# Patient Record
Sex: MALE | Race: WHITE | NOT HISPANIC OR LATINO | Employment: OTHER | URBAN - METROPOLITAN AREA
[De-identification: names, ages, dates, MRNs, and addresses within clinical notes are randomized per-mention and may not be internally consistent; named-entity substitution may affect disease eponyms.]

---

## 2017-04-03 ENCOUNTER — OFFICE VISIT (OUTPATIENT)
Dept: FAMILY MEDICINE | Facility: CLINIC | Age: 67
End: 2017-04-03

## 2017-04-03 VITALS
BODY MASS INDEX: 38.95 KG/M2 | HEART RATE: 71 BPM | HEIGHT: 69 IN | SYSTOLIC BLOOD PRESSURE: 126 MMHG | TEMPERATURE: 98.6 F | DIASTOLIC BLOOD PRESSURE: 70 MMHG | OXYGEN SATURATION: 97 % | WEIGHT: 263 LBS

## 2017-04-03 DIAGNOSIS — L30.9 DERMATITIS: ICD-10-CM

## 2017-04-03 DIAGNOSIS — Z23 NEED FOR VACCINATION: ICD-10-CM

## 2017-04-03 DIAGNOSIS — R21 RASH AND NONSPECIFIC SKIN ERUPTION: Primary | ICD-10-CM

## 2017-04-03 PROCEDURE — 90715 TDAP VACCINE 7 YRS/> IM: CPT | Performed by: FAMILY MEDICINE

## 2017-04-03 PROCEDURE — 90471 IMMUNIZATION ADMIN: CPT | Performed by: FAMILY MEDICINE

## 2017-04-03 PROCEDURE — 99213 OFFICE O/P EST LOW 20 MIN: CPT | Mod: 25 | Performed by: FAMILY MEDICINE

## 2017-04-03 RX ORDER — DESONIDE 0.5 MG/G
OINTMENT TOPICAL 3 TIMES DAILY PRN
Qty: 30 G | Refills: 1 | Status: SHIPPED | OUTPATIENT
Start: 2017-04-03 | End: 2020-06-08

## 2017-04-03 NOTE — NURSING NOTE
Maco Montes De Oca is here today for a Derm Issue: Spot on the upper lip right under his nose, x3-4 months, some discharge (bloody)     Pre-Visit Screening :  Immunizations : not up to date - Tdap Today  Colon Screening : is up to date  Asthma Action Test/Plan : NA  PHQ9/GAD7 :  NA  Pulse - regular  My Chart - declines    CLASSIFICATION OF OVERWEIGHT AND OBESITY BY BMI                         Obesity Class           BMI(kg/m2)  Underweight                                    < 18.5  Normal                                         18.5-24.9  Overweight                                     25.0-29.9  OBESITY                     I                  30.0-34.9                              II                 35.0-39.9  EXTREME OBESITY             III                >40                             Patient's  BMI Body mass index is 39.12 kg/(m^2).  http://hin.nhlbi.nih.gov/menuplanner/menu.cgi  Questioned patient about current smoking habits.  Pt. quit smoking some time ago.  Lupe Langley, CMA

## 2017-04-03 NOTE — PROGRESS NOTES
SUBJECTIVE:                                                    Maco Montes De Oca is a 66 year old male who presents to clinic today for the following health issues:        Rash     Onset: 2 mo    Description:   Location: nasolabial  Character: raised, red  Itching (Pruritis): YES- sometimes    Progression of Symptoms:  waxing and waning    Accompanying Signs & Symptoms:  Fever: no   Body aches or joint pain: no   Sore throat symptoms: no   Recent cold symptoms: no but has chronic nasal drainage that may irritate the area   History:   Previous similar rash: no     Precipitating factors:   Exposure to similar rash: no   New exposures: None   Recent travel: no     Alleviating factors:       Therapies Tried and outcome: none    The area bleeds when he shaves          Problem list and histories reviewed & adjusted, as indicated.  Additional history: as documented    Patient Active Problem List   Diagnosis     Essential hypertension, benign     Vesicular palmoplantar eczema     Primary localized osteoarthrosis, other specified sites     ACP (advance care planning)     Health Care Home     Family history of prostate cancer     Dacrocystitis, left     Idiopathic chronic gout without tophus, unspecified site     Past Surgical History:   Procedure Laterality Date     C ANESTH,HIP JOINT SURGERY      slipped epiphysis     HC PHAKIC IOL - IMPLANT FROM SURGEON  2013    right      HC PHAKIC IOL - IMPLANT FROM SURGEON  2013    left eye        Social History   Substance Use Topics     Smoking status: Former Smoker     Quit date: 1992     Smokeless tobacco: Former User     Types: Chew     Alcohol use No     Family History   Problem Relation Age of Onset     HEART DISEASE Mother       age 75     HEART DISEASE Father       mi age 65     Arthritis Brother      lupus         Allergies   Allergen Reactions     Allopurinol      vasculitis, Dr Marin confirmed     Penicillins      Recent Labs   Lab Test  16   0911   "05/05/15   0925  02/07/14   0955   LDL  82  74  79   HDL  37*  35*  31*   TRIG  146  137  139   ALT  17  20  18   CR  0.95  1.11  1.01   GFRESTIMATED  84  70  79   POTASSIUM  4.5  4.4  4.6      BP Readings from Last 3 Encounters:   04/03/17 126/70   11/10/16 128/68   05/24/16 134/72    Wt Readings from Last 3 Encounters:   04/03/17 119.3 kg (263 lb)   11/10/16 120.7 kg (266 lb)   05/24/16 119.6 kg (263 lb 9.6 oz)                    ROS:  Constitutional, HEENT, cardiovascular, pulmonary, gi and gu systems are negative, except as otherwise noted.    OBJECTIVE:                                                    /70 (BP Location: Left arm, Patient Position: Chair, Cuff Size: Adult Large)  Pulse 71  Temp 98.6  F (37  C) (Oral)  Ht 1.746 m (5' 8.75\")  Wt 119.3 kg (263 lb)  SpO2 97%  BMI 39.12 kg/m2  Body mass index is 39.12 kg/(m^2).   GENERAL: healthy, alert and no distress  NECK: no adenopathy, no asymmetry, masses, or scars and thyroid normal to palpation  RESP: lungs clear to auscultation - no rales, rhonchi or wheezes  CV: regular rate and rhythm, normal S1 S2, no S3 or S4, no murmur, click or rub, no peripheral edema and peripheral pulses strong  ABDOMEN: soft, nontender, no hepatosplenomegaly, no masses and bowel sounds normal  SKIN: pt has erythematous maculopapular patch with several small pustules in midline nasolabial region    Diagnostic Test Results:  none      ASSESSMENT:                                                        PLAN:                                                    (R21) Rash and nonspecific skin eruption  (primary encounter diagnosis)  Comment: likely small patch folliculitis in shaved area vs staph/impetigo  Plan: mupirocin (BACTROBAN NASAL) 2 % nasal ointment        Will try topical abx, if not better consider derm referral    (L30.9) Dermatitis  Comment: ear dermatitis stable  Plan: desonide (DESOWEN) 0.05 % ointment        I reviewed the risks, benefits, and possible side " "effects of the medication.  The patient had an opportunity to ask any questions regarding the treatment plan. The patient was encouraged to call my office if any problems.     (Z23) Need for vaccination  Comment:   Plan: TDAP VACCINE (BOOSTRIX), VACCINE         ADMINISTRATION, INITIAL              BMI:   Estimated body mass index is 39.12 kg/(m^2) as calculated from the following:    Height as of this encounter: 1.746 m (5' 8.75\").    Weight as of this encounter: 119.3 kg (263 lb).   Weight management plan: Discussed healthy diet and exercise guidelines and patient will follow up in 6 months in clinic to re-evaluate.      Work on weight loss  Regular exercise    Maco Galindo MD  [facility]      "

## 2017-04-03 NOTE — MR AVS SNAPSHOT
"              After Visit Summary   4/3/2017    Maco Montes De cOa    MRN: [id number]           Patient Information     Date Of Birth          1950        Visit Information        Provider Department      4/3/2017 3:15 PM Maco Galindo MD [facility]        Today's Diagnoses     Rash and nonspecific skin eruption    -  1    Dermatitis        Need for vaccination           Follow-ups after your visit        Your next 10 appointments already scheduled     May 25, 2017  8:00 AM CDT   Physical-Complete with Maco Galindo MD   [facility] ([facility])    [address]  [address]  [facility] [id number] [phone]           Instruct patient that they should have nothing(except water) after midnight the evening prior to the appointment.              Who to contact     If you have questions or need follow up information about today's clinic visit or your schedule please contact [facility] directly at [phone].  Normal or non-critical lab and imaging results will be communicated to you by MyChart, letter or phone within 4 business days after the clinic has received the results. If you do not hear from us within 7 days, please contact the clinic through [employer] or phone. If you have a critical or abnormal lab result, we will notify you by phone as soon as possible.  Submit refill requests through [employer] or call your pharmacy and they will forward the refill request to us. Please allow 3 business days for your refill to be completed.          Additional Information About Your Visit        Care EveryWhere ID     This is your Care EveryWhere ID. This could be used by other organizations to access your [facility] medical records  [id number]        Your Vitals Were     Pulse Temperature Height Pulse Oximetry BMI (Body Mass Index)       71 98.6  F (37  C) (Oral) 1.746 m (5' 8.75\") 97% 39.12 kg/m2  "       Blood Pressure from Last 3 Encounters:   04/03/17 126/70   11/10/16 128/68   05/24/16 134/72    Weight from Last 3 Encounters:   04/03/17 119.3 kg (263 lb)   11/10/16 120.7 kg (266 lb)   05/24/16 119.6 kg (263 lb 9.6 oz)              We Performed the Following     TDAP VACCINE (BOOSTRIX)     VACCINE ADMINISTRATION, INITIAL          Today's Medication Changes          These changes are accurate as of: 4/3/17  4:13 PM.  If you have any questions, ask your nurse or doctor.               Start taking these medicines.        Dose/Directions    mupirocin 2 % nasal ointment   Commonly known as:  BACTROBAN NASAL   Used for:  Rash and nonspecific skin eruption   Started by:  Maco Galindo MD        Dose:  1 g   Apply 1 g into each nare 3 times daily for 5 days   Quantity:  22 g   Refills:  0            Where to get your medicines      These medications were sent to [facility]/pharmacy #[id number] - [facility] [address]  [address] [email or web address] [address]     Phone:  [phone]     mupirocin 2 % nasal ointment         Some of these will need a paper prescription and others can be bought over the counter.  Ask your nurse if you have questions.     Bring a paper prescription for each of these medications     desonide 0.05 % ointment                Primary Care Provider Office Phone # Fax #    Maco Galindo [phone]       [place] [address] [facility] [address]  [facility] [address]        Thank you!     Thank you for choosing [facility]  for your care. Our goal is always to provide you with excellent care. Hearing back from our patients is one way we can continue to improve our services. Please take a few minutes to complete the written survey that you may receive in the mail after your visit with us. Thank you!             Your Updated Medication List - Protect others around you: Learn how to safely use, store and throw away  your medicines at www.disposemymeds.org.          This list is accurate as of: 4/3/17  4:13 PM.  Always use your most recent med list.                   Brand Name Dispense Instructions for use    desonide 0.05 % ointment    DESOWEN    30 g    Apply topically 3 times daily as needed Apply sparingly to affected area.  Will call when needed       lisinopril 40 MG tablet    PRINIVIL/ZESTRIL    90 tablet    Take 1 tablet (40 mg) by mouth daily       mupirocin 2 % nasal ointment    BACTROBAN NASAL    22 g    Apply 1 g into each nare 3 times daily for 5 days       probenecid 500 MG tablet    BENEMID    180 tablet    TAKE 1 TABLET BY MOUTH TWICE A DAY

## 2017-06-19 ENCOUNTER — OFFICE VISIT (OUTPATIENT)
Dept: FAMILY MEDICINE | Facility: CLINIC | Age: 67
End: 2017-06-19

## 2017-06-19 VITALS
BODY MASS INDEX: 39.77 KG/M2 | WEIGHT: 262.4 LBS | SYSTOLIC BLOOD PRESSURE: 128 MMHG | TEMPERATURE: 98.4 F | OXYGEN SATURATION: 97 % | HEIGHT: 68 IN | HEART RATE: 83 BPM | DIASTOLIC BLOOD PRESSURE: 70 MMHG

## 2017-06-19 DIAGNOSIS — Z00.00 ROUTINE GENERAL MEDICAL EXAMINATION AT A HEALTH CARE FACILITY: Primary | ICD-10-CM

## 2017-06-19 DIAGNOSIS — M1A.00X0 IDIOPATHIC CHRONIC GOUT WITHOUT TOPHUS, UNSPECIFIED SITE: ICD-10-CM

## 2017-06-19 DIAGNOSIS — I10 ESSENTIAL HYPERTENSION, BENIGN: ICD-10-CM

## 2017-06-19 DIAGNOSIS — Z80.42 FAMILY HISTORY OF PROSTATE CANCER: ICD-10-CM

## 2017-06-19 PROCEDURE — 80053 COMPREHEN METABOLIC PANEL: CPT | Mod: 90 | Performed by: FAMILY MEDICINE

## 2017-06-19 PROCEDURE — 99397 PER PM REEVAL EST PAT 65+ YR: CPT | Performed by: FAMILY MEDICINE

## 2017-06-19 PROCEDURE — G0103 PSA SCREENING: HCPCS | Performed by: FAMILY MEDICINE

## 2017-06-19 PROCEDURE — 36415 COLL VENOUS BLD VENIPUNCTURE: CPT | Performed by: FAMILY MEDICINE

## 2017-06-19 PROCEDURE — 86803 HEPATITIS C AB TEST: CPT | Mod: 90 | Performed by: FAMILY MEDICINE

## 2017-06-19 PROCEDURE — 84550 ASSAY OF BLOOD/URIC ACID: CPT | Mod: 90 | Performed by: FAMILY MEDICINE

## 2017-06-19 RX ORDER — CLOSTRIDIUM TETANI TOXOID ANTIGEN (FORMALDEHYDE INACTIVATED), CORYNEBACTERIUM DIPHTHERIAE TOXOID ANTIGEN (FORMALDEHYDE INACTIVATED), BORDETELLA PERTUSSIS TOXOID ANTIGEN (GLUTARALDEHYDE INACTIVATED), BORDETELLA PERTUSSIS FILAMENTOUS HEMAGGLUTININ ANTIGEN (FORMALDEHYDE INACTIVATED), BORDETELLA PERTUSSIS PERTACTIN ANTIGEN, AND BORDETELLA PERTUSSIS FIMBRIAE 2/3 ANTIGEN 5; 2; 2.5; 5; 3; 5 [LF]/.5ML; [LF]/.5ML; UG/.5ML; UG/.5ML; UG/.5ML; UG/.5ML
INJECTION, SUSPENSION INTRAMUSCULAR
COMMUNITY
Start: 2017-04-03 | End: 2018-05-15

## 2017-06-19 RX ORDER — LISINOPRIL 40 MG/1
40 TABLET ORAL DAILY
Qty: 90 TABLET | Refills: 1 | Status: SHIPPED | OUTPATIENT
Start: 2017-06-19 | End: 2017-11-20

## 2017-06-19 RX ORDER — MUPIROCIN 20 MG/G
OINTMENT TOPICAL
Refills: 0 | COMMUNITY
Start: 2017-04-03 | End: 2018-05-15

## 2017-06-19 RX ORDER — PROBENECID 500 MG/1
TABLET, FILM COATED ORAL
Qty: 180 TABLET | Refills: 1 | Status: SHIPPED | OUTPATIENT
Start: 2017-06-19 | End: 2017-11-20

## 2017-06-19 NOTE — NURSING NOTE
"Maco oMntes De Oca is here for a CPX. Fasting: Yes 12+ hours.    Pre-visit Planning  Immunizations -up to date  Colonoscopy -is up to date (Performed on 01/11/2016)  Mammogram -NA  Asthma test --NA  PHQ2 -is completed today  YELITZA 7 -NA  Fall Risk Assessment -is completed today    Vitals:  BP Cuff left  Arm with large Cuff  PULSE regular  262 lbs 6.4 oz and 5' 7.5\"  CLASSIFICATION OF OVERWEIGHT AND OBESITY BY BMI                        Obesity Class           BMI(kg/m2)  Underweight                                    < 18.5  Normal                                         18.5-24.9  Overweight                                     25.0-29.9  OBESITY                     I                  30.0-34.9                             II                 35.0-39.9  EXTREME OBESITY             III                >40      Patient's  BMI Body mass index is 40.49 kg/(m^2).  Http://hin.nhlbi.nih.gov/menuplanner/menu.cgi  Tobacco Use:  Questioned patient about current smoking habits.  Pt. quit smoking some time ago.  ETOH screening Questions:  1-How often do you have a drink containing alcohol?                             Never  2-How many drinks containing alcohol do you have on a typical day when you are         Drinking?                              N/A   3- How often do you have 5 or more drinks on one occasion?                              N/A     Have you ever:  None of the patient's responses to the CAGE screening were positive / Negative CAGE score    Roomed by: Lupe Langley CMA      "

## 2017-06-19 NOTE — LETTER
[facility]  [facility] [address]  [place]  [address]    June 20, 2017            Maco Montes De Oca  [address] [facility] [address]                  Dear Maco Montes De Oca      LAB RESULTS:     The results of your recent hepatitis C test, uric acid, Blood Sugar, Kidney Tests and Liver Panel were NORMAL.    Your PSA test has risen so I would like to have you recheck this in 3-6 months. If it is higher at that time we will refer you to urology.       If you have any further questions or problems, please contact our office at [phone].          Maco Galindo MD

## 2017-06-19 NOTE — MR AVS SNAPSHOT
"              After Visit Summary   6/19/2017    Maco Montes De Oca    MRN: [id number]           Patient Information     Date Of Birth          1950        Visit Information        Provider Department      6/19/2017 10:30 AM Maco Galindo MD [facility]        Today's Diagnoses     Routine general medical examination at a health care facility    -  1    Essential hypertension, benign        Idiopathic chronic gout without tophus, unspecified site        Family history of prostate cancer           Follow-ups after your visit        Follow-up notes from your care team     Return in about 1 year (around 6/19/2018).      Who to contact     If you have questions or need follow up information about today's clinic visit or your schedule please contact [facility] directly at [phone].  Normal or non-critical lab and imaging results will be communicated to you by MyChart, letter or phone within 4 business days after the clinic has received the results. If you do not hear from us within 7 days, please contact the clinic through MyChart or phone. If you have a critical or abnormal lab result, we will notify you by phone as soon as possible.  Submit refill requests through [employer] or call your pharmacy and they will forward the refill request to us. Please allow 3 business days for your refill to be completed.          Additional Information About Your Visit        Care EveryWhere ID     This is your Care EveryWhere ID. This could be used by other organizations to access your [place] medical records  [id number]        Your Vitals Were     Pulse Temperature Height Pulse Oximetry BMI (Body Mass Index)       83 98.4  F (36.9  C) (Oral) 1.715 m (5' 7.5\") 97% 40.49 kg/m2        Blood Pressure from Last 3 Encounters:   06/19/17 128/70   04/03/17 126/70   11/10/16 128/68    Weight from Last 3 Encounters:   06/19/17 119 kg (262 lb 6.4 oz)   04/03/17 119.3 kg (263 lb) "   11/10/16 120.7 kg (266 lb)              We Performed the Following     COMPREHENSIVE METABOLIC PANEL ([facility]) XCMP     HCL PSA, SCREENING ([facility])     Hepatits C antibody ([facility])     URIC ACID ([facility])     VENOUS COLLECTION          Where to get your medicines      These medications were sent to [facility]/pharmacy #[id number] - [place], [place] - [address]  [address] [address] [employer], [facility] [address]     Phone:  [phone]     lisinopril 40 MG tablet    probenecid 500 MG tablet          Primary Care Provider Office Phone # Fax #    Maco Galindo [phone]       [facility] [address] [facility] [address]  [facility] [address]        Thank you!     Thank you for choosing [facility]  for your care. Our goal is always to provide you with excellent care. Hearing back from our patients is one way we can continue to improve our services. Please take a few minutes to complete the written survey that you may receive in the mail after your visit with us. Thank you!             Your Updated Medication List - Protect others around you: Learn how to safely use, store and throw away your medicines at www.disposemymeds.org.          This list is accurate as of: 6/19/17 11:01 AM.  Always use your most recent med list.                   Brand Name Dispense Instructions for use    ADACEL 5-2-15.5 LF-MCG/0.5 injection   Generic drug:  Tdap (tetanus-diphtheria-acell pertussis)          desonide 0.05 % ointment    DESOWEN    30 g    Apply topically 3 times daily as needed Apply sparingly to affected area.  Will call when needed       lisinopril 40 MG tablet    PRINIVIL/ZESTRIL    90 tablet    Take 1 tablet (40 mg) by mouth daily       mupirocin 2 % ointment    BACTROBAN     APPLY 1 GRAM INTO EACH NOSTRIL 3 TIMES DAILY FOR 5 DAYS       probenecid 500 MG tablet    BENEMID    180 tablet    TAKE 1 TABLET BY MOUTH TWICE A DAY

## 2017-06-19 NOTE — PROGRESS NOTES
SUBJECTIVE:     CC: Maco Montes De Oca is an 66 year old male who presents for preventative health visit.     Healthy Habits:    Do you get at least three servings of calcium containing foods daily (dairy, green leafy vegetables, etc.)? yes    Amount of exercise or daily activities, outside of work: 5 day(s) per week    Problems taking medications regularly No    Medication side effects: No    Have you had an eye exam in the past two years? no    Do you see a dentist twice per year? yes    Do you have sleep apnea, excessive snoring or daytime drowsiness?no            Today's PHQ-2 Score:   PHQ-2 ( 1999 Pfizer) 6/19/2017 5/24/2016   Q1: Little interest or pleasure in doing things 0 0   Q2: Feeling down, depressed or hopeless 0 0   PHQ-2 Score 0 0       Abuse: Current or Past(Physical, Sexual or Emotional)- No  Do you feel safe in your environment - Yes    Social History   Substance Use Topics     Smoking status: Former Smoker     Quit date: 1/1/1992     Smokeless tobacco: Former User     Types: Chew     Alcohol use No     The patient does not drink >3 drinks per day nor >7 drinks per week.    Last PSA:   Abbott PSA   Date Value Ref Range Status   05/05/2015 2.2 < OR = 4.0 ng/mL Final     Comment:        This test was performed using the Siemens  chemiluminescent method. Values obtained from  different assay methods cannot be used  interchangeably. PSA levels, regardless of  value, should not be interpreted as absolute  evidence of the presence or absence of disease.            Recent Labs   Lab Test  05/24/16   0928  05/05/15   0925   CHOL  148  136   HDL  37*  35*   LDL  82  74   TRIG  146  137   CHOLHDLRATIO  4.0  3.9       Reviewed orders with patient. Reviewed health maintenance and updated orders accordingly - Yes    Reviewed and updated as needed this visit by clinical staff  Tobacco  Allergies  Meds  Problems         Reviewed and updated as needed this visit by Provider        No past medical history on  file.   Past Surgical History:   Procedure Laterality Date     C ANESTH,HIP JOINT SURGERY      slipped epiphysis     HC PHAKIC IOL - IMPLANT FROM SURGEON  2013    right      HC PHAKIC IOL - IMPLANT FROM SURGEON  2013    left eye        ROS:  C: NEGATIVE for fever, chills, change in weight  I: NEGATIVE for worrisome rashes, moles or lesions  E: NEGATIVE for vision changes or irritation  ENT: NEGATIVE for ear, mouth and throat problems  R: NEGATIVE for significant cough or SOB  CV: NEGATIVE for chest pain, palpitations or peripheral edema  GI: NEGATIVE for nausea, abdominal pain, heartburn, or change in bowel habits   male: negative for dysuria, hematuria, decreased urinary stream, erectile dysfunction, urethral discharge  M: NEGATIVE for significant arthralgias or myalgia  N: NEGATIVE for weakness, dizziness or paresthesias  E: NEGATIVE for temperature intolerance, skin/hair changes  H: NEGATIVE for bleeding problems  P: NEGATIVE for changes in mood or affect    Problem list, Medication list, Allergies, and Medical/Social/Surgical histories reviewed in [facility] and updated as appropriate.  Labs reviewed in EPIC  BP Readings from Last 3 Encounters:   06/19/17 128/70   04/03/17 126/70   11/10/16 128/68    Wt Readings from Last 3 Encounters:   06/19/17 119 kg (262 lb 6.4 oz)   04/03/17 119.3 kg (263 lb)   11/10/16 120.7 kg (266 lb)                  Patient Active Problem List   Diagnosis     Essential hypertension, benign     Vesicular palmoplantar eczema     Primary localized osteoarthrosis, other specified sites     ACP (advance care planning)     Health Care Home     Family history of prostate cancer     Dacrocystitis, left     Idiopathic chronic gout without tophus, unspecified site     Past Surgical History:   Procedure Laterality Date     C ANESTH,HIP JOINT SURGERY      slipped epiphysis     HC PHAKIC IOL - IMPLANT FROM SURGEON  2013    right      HC PHAKIC IOL - IMPLANT FROM SURGEON  2013    left eye        Social  "History   Substance Use Topics     Smoking status: Former Smoker     Quit date: 1992     Smokeless tobacco: Former User     Types: Chew     Alcohol use No     Family History   Problem Relation Age of Onset     HEART DISEASE Mother       age 75     HEART DISEASE Father       mi age 65     Arthritis Brother      lupus         Current Outpatient Prescriptions   Medication Sig Dispense Refill     lisinopril (PRINIVIL/ZESTRIL) 40 MG tablet Take 1 tablet (40 mg) by mouth daily 90 tablet 1     probenecid (BENEMID) 500 MG tablet TAKE 1 TABLET BY MOUTH TWICE A  tablet 1     desonide (DESOWEN) 0.05 % ointment Apply topically 3 times daily as needed Apply sparingly to affected area.    Will call when needed 30 g 1     ADACEL 5-2-15.5 LF-MCG/0.5 injection        mupirocin (BACTROBAN) 2 % ointment APPLY 1 GRAM INTO EACH NOSTRIL 3 TIMES DAILY FOR 5 DAYS  0     [DISCONTINUED] lisinopril (PRINIVIL,ZESTRIL) 40 MG tablet Take 1 tablet (40 mg) by mouth daily 90 tablet 1     Allergies   Allergen Reactions     Allopurinol      vasculitis, Dr Marin confirmed     Penicillins      Recent Labs   Lab Test  16   0928  05/05/15   0925  14   0955   LDL  82  74  79   HDL  37*  35*  31*   TRIG  146  137  139   ALT  17  20  18   CR  0.95  1.11  1.01   GFRESTIMATED  84  70  79   POTASSIUM  4.5  4.4  4.6      OBJECTIVE:     /70 (BP Location: Left arm, Patient Position: Chair, Cuff Size: Adult Large)  Pulse 83  Temp 98.4  F (36.9  C) (Oral)  Ht 1.715 m (5' 7.5\")  Wt 119 kg (262 lb 6.4 oz)  SpO2 97%  BMI 40.49 kg/m2  EXAM:  GENERAL: healthy, alert and no distress  EYES: Eyes grossly normal to inspection, PERRL and conjunctivae and sclerae normal  HENT: ear canals and TM's normal, nose and mouth without ulcers or lesions  NECK: no adenopathy, no asymmetry, masses, or scars and thyroid normal to palpation  RESP: lungs clear to auscultation - no rales, rhonchi or wheezes  CV: regular rate and rhythm, normal S1 " "S2, no S3 or S4, no murmur, click or rub, no peripheral edema and peripheral pulses strong  ABDOMEN: soft, nontender, no hepatosplenomegaly, no masses and bowel sounds normal   (male): normal male genitalia without lesions or urethral discharge, no hernia  RECTAL (male): deferred  MS: no gross musculoskeletal defects noted, no edema  SKIN: no suspicious lesions or rashes  NEURO: Normal strength and tone, mentation intact and speech normal  PSYCH: mentation appears normal, affect normal/bright  LYMPH: no cervical, supraclavicular, axillary, or inguinal adenopathy    ASSESSMENT/PLAN:     (Z00.00) Routine general medical examination at a health care facility  (primary encounter diagnosis)  Comment: discussed preventitive healthcare   Plan: Hepatits C antibody ([facility]), VENOUS COLLECTION,        HCL PSA, SCREENING ([facility]), COMPREHENSIVE         METABOLIC PANEL ([facility]) XCMP        Continue to work on healthy diet and exercise, discussed healthy habits     (I10) Essential hypertension, benign  Comment: well control  Plan: lisinopril (PRINIVIL/ZESTRIL) 40 MG tablet,         COMPREHENSIVE METABOLIC PANEL ([facility]) XCMP        continue current medications at current doses     (M1A.00X0) Idiopathic chronic gout without tophus, unspecified site  Comment: well controlled  Plan: probenecid (BENEMID) 500 MG tablet        continue current medications at current doses     (Z80.42) Family history of prostate cancer  Comment:   Plan: HCL PSA, SCREENING ([facility])              COUNSELING:  Reviewed preventive health counseling, as reflected in patient instructions       Regular exercise       Healthy diet/nutrition       Colon cancer screening       Prostate cancer screening         reports that he quit smoking about 25 years ago. He has quit using smokeless tobacco. His smokeless tobacco use included Chew.    Estimated body mass index is 40.49 kg/(m^2) as calculated from the following:    Height as of this encounter: 1.715 m (5' 7.5\").   "  Weight as of this encounter: 119 kg (262 lb 6.4 oz).   Weight management plan: Discussed healthy diet and exercise guidelines and patient will follow up in 12 months in clinic to re-evaluate.    Counseling Resources:  ATP IV Guidelines  Pooled Cohorts Equation Calculator  FRAX Risk Assessment  ICSI Preventive Guidelines  Dietary Guidelines for Americans, 2010  USDA's MyPlate  ASA Prophylaxis  Lung CA Screening    Maco Galindo MD  [facility]

## 2017-06-20 LAB
ABBOTT PSA - QUEST: 4.6 NG/ML
ALBUMIN SERPL-MCNC: 4.4 G/DL (ref 3.6–5.1)
ALBUMIN/GLOB SERPL: 1.5 (CALC) (ref 1–2.5)
ALP SERPL-CCNC: 74 U/L (ref 40–115)
ALT SERPL-CCNC: 17 U/L (ref 9–46)
AST SERPL-CCNC: 16 U/L (ref 10–35)
BILIRUB SERPL-MCNC: 1.2 MG/DL (ref 0.2–1.2)
BUN SERPL-MCNC: 14 MG/DL (ref 7–25)
BUN/CREATININE RATIO: NORMAL (CALC) (ref 6–22)
CALCIUM SERPL-MCNC: 10.2 MG/DL (ref 8.6–10.3)
CHLORIDE SERPLBLD-SCNC: 104 MMOL/L (ref 98–110)
CO2 SERPL-SCNC: 24 MMOL/L (ref 20–31)
CREAT SERPL-MCNC: 0.96 MG/DL (ref 0.7–1.25)
EGFR AFRICAN AMERICAN - QUEST: 95 ML/MIN/1.73M2
GFR SERPL CREATININE-BSD FRML MDRD: 82 ML/MIN/1.73M2
GLOBULIN, CALCULATED - QUEST: 3 G/DL (CALC) (ref 1.9–3.7)
GLUCOSE - QUEST: 98 MG/DL (ref 65–99)
HCV AB - QUEST: NORMAL
POTASSIUM SERPL-SCNC: 4.3 MMOL/L (ref 3.5–5.3)
PROT SERPL-MCNC: 7.4 G/DL (ref 6.1–8.1)
SIGNAL TO CUT OFF - QUEST: 0.02
SODIUM SERPL-SCNC: 141 MMOL/L (ref 135–146)
URATE SERPL-MCNC: 6.8 MG/DL (ref 4–8)

## 2017-11-20 ENCOUNTER — OFFICE VISIT (OUTPATIENT)
Dept: FAMILY MEDICINE | Facility: CLINIC | Age: 67
End: 2017-11-20

## 2017-11-20 VITALS
TEMPERATURE: 98.3 F | OXYGEN SATURATION: 98 % | SYSTOLIC BLOOD PRESSURE: 138 MMHG | HEART RATE: 64 BPM | WEIGHT: 264 LBS | BODY MASS INDEX: 39.1 KG/M2 | DIASTOLIC BLOOD PRESSURE: 72 MMHG | HEIGHT: 69 IN

## 2017-11-20 DIAGNOSIS — Z23 NEED FOR VACCINATION: ICD-10-CM

## 2017-11-20 DIAGNOSIS — R97.20 ELEVATED PROSTATE SPECIFIC ANTIGEN (PSA): ICD-10-CM

## 2017-11-20 DIAGNOSIS — I10 ESSENTIAL HYPERTENSION, BENIGN: Primary | ICD-10-CM

## 2017-11-20 DIAGNOSIS — M1A.00X0 IDIOPATHIC CHRONIC GOUT WITHOUT TOPHUS, UNSPECIFIED SITE: ICD-10-CM

## 2017-11-20 PROCEDURE — 90686 IIV4 VACC NO PRSV 0.5 ML IM: CPT | Performed by: FAMILY MEDICINE

## 2017-11-20 PROCEDURE — 99213 OFFICE O/P EST LOW 20 MIN: CPT | Mod: 25 | Performed by: FAMILY MEDICINE

## 2017-11-20 PROCEDURE — 84153 ASSAY OF PSA TOTAL: CPT | Mod: 90 | Performed by: FAMILY MEDICINE

## 2017-11-20 PROCEDURE — 36415 COLL VENOUS BLD VENIPUNCTURE: CPT | Performed by: FAMILY MEDICINE

## 2017-11-20 PROCEDURE — 90471 IMMUNIZATION ADMIN: CPT | Performed by: FAMILY MEDICINE

## 2017-11-20 PROCEDURE — 80053 COMPREHEN METABOLIC PANEL: CPT | Mod: 90 | Performed by: FAMILY MEDICINE

## 2017-11-20 RX ORDER — PROBENECID 500 MG/1
TABLET, FILM COATED ORAL
Qty: 180 TABLET | Refills: 1 | Status: SHIPPED | OUTPATIENT
Start: 2017-11-20 | End: 2018-06-07

## 2017-11-20 RX ORDER — LISINOPRIL 40 MG/1
40 TABLET ORAL DAILY
Qty: 90 TABLET | Refills: 1 | Status: SHIPPED | OUTPATIENT
Start: 2017-11-20 | End: 2018-06-07

## 2017-11-20 NOTE — LETTER
November 21, 2017      Maco Montes De Oca  [address]  [facility] [address]    Maco Montes De Oca     Your PSA has risen a bit more so I do recommend you see urology.  I think you were planning to go to [place] so let me know if you need something on out end.    The results of your recent Kidney Tests and Liver Panel were within normal limits. The results are now released on My Chart. Please contact me if you have further questions or concerns.    Sincerely,    HEATHER Galindo M.D.     Lab Results   Component Value Date    PSA 5.8 11/20/2017    PSA 4.6 06/19/2017             Resulted Orders   PSA, SCREENING   Result Value Ref Range    Abbott PSA 5.8 (H) < OR = 4.0 ng/mL      Comment:      The total PSA value from this assay system is   standardized against the WHO standard. The test   result will be approximately 20% lower when compared   to the equimolar-standardized total PSA (Fco   Vidya). Comparison of serial PSA results should be   interpreted with this fact in mind.     This test was performed using the Siemens   chemiluminescent method. Values obtained from   different assay methods cannot be used  interchangeably. PSA levels, regardless of  value, should not be interpreted as absolute  evidence of the presence or absence of disease.     Comprehensive metabolic panel   Result Value Ref Range    Glucose 119 (H) 65 - 99 mg/dL      Comment:                    Fasting reference interval     For someone without known diabetes, a glucose value  between 100 and 125 mg/dL is consistent with  prediabetes and should be confirmed with a  follow-up test.         Urea Nitrogen 15 7 - 25 mg/dL    Creatinine 0.91 0.70 - 1.25 mg/dL      Comment:      For patients >49 years of age, the reference limit  for Creatinine is approximately 13% higher for people  identified as -American.         GFR Estimate 88 > OR = 60 mL/min/1.73m2    EGFR African American 101 > OR = 60 mL/min/1.73m2    BUN/Creatinine Ratio  NOT APPLICABLE 6 - 22 (calc)    Sodium 139 135 - 146 mmol/L    Potassium 4.1 3.5 - 5.3 mmol/L    Chloride 104 98 - 110 mmol/L    Carbon Dioxide 25 20 - 31 mmol/L    Calcium 9.5 8.6 - 10.3 mg/dL    Protein Total 7.3 6.1 - 8.1 g/dL    Albumin 4.3 3.6 - 5.1 g/dL    Globulin Calculated 3.0 1.9 - 3.7 g/dL (calc)    A/G Ratio 1.4 1.0 - 2.5 (calc)    Bilirubin Total 1.1 0.2 - 1.2 mg/dL    Alkaline Phosphatase 64 40 - 115 U/L    AST 16 10 - 35 U/L    ALT 16 9 - 46 U/L       If you have any questions or concerns, please call the clinic at the number listed above.       Sincerely,        Maco Galindo MD

## 2017-11-20 NOTE — NURSING NOTE
Maco Montes De Oca is here today for a non fasting medication recheck and PSA level recheck.    Pre-Visit Screening :  Immunizations : up to date - Flu Shot  Colon Screening : is up to date  Asthma Action Test/Plan : NA  PHQ9/GAD7 :  NA    Pulse - regular  My Chart - declines    CLASSIFICATION OF OVERWEIGHT AND OBESITY BY BMI                         Obesity Class           BMI(kg/m2)  Underweight                                    < 18.5  Normal                                         18.5-24.9  Overweight                                     25.0-29.9  OBESITY                     I                  30.0-34.9                              II                 35.0-39.9  EXTREME OBESITY             III                >40                             Patient's  BMI Body mass index is 38.99 kg/(m^2).  http://hin.nhlbi.nih.gov/menuplanner/menu.cgi  Questioned patient about current smoking habits.  Pt. quit smoking some time ago.    The patient has verbalized that it is ok to leave a detailed voice message on the patient's cell phone with results/recommendations from this visit.     Lupe Langley, CMA

## 2017-11-20 NOTE — MR AVS SNAPSHOT
"              After Visit Summary   11/20/2017    Maco Montes De Oca    MRN: [id number]           Patient Information     Date Of Birth          1950        Visit Information        Provider Department      11/20/2017 11:00 AM Maco Galindo MD [facility]        Today's Diagnoses     Essential hypertension, benign    -  1    Idiopathic chronic gout without tophus, unspecified site        Elevated prostate specific antigen (PSA)        Need for vaccination           Follow-ups after your visit        Who to contact     If you have questions or need follow up information about today's clinic visit or your schedule please contact [facility] directly at [phone].  Normal or non-critical lab and imaging results will be communicated to you by MyChart, letter or phone within 4 business days after the clinic has received the results. If you do not hear from us within 7 days, please contact the clinic through MyChart or phone. If you have a critical or abnormal lab result, we will notify you by phone as soon as possible.  Submit refill requests through [employer] or call your pharmacy and they will forward the refill request to us. Please allow 3 business days for your refill to be completed.          Additional Information About Your Visit        Care EveryWhere ID     This is your Care EveryWhere ID. This could be used by other organizations to access your [place] medical records  [id number]        Your Vitals Were     Pulse Temperature Height Pulse Oximetry BMI (Body Mass Index)       64 98.3  F (36.8  C) (Oral) 1.753 m (5' 9\") 98% 38.99 kg/m2        Blood Pressure from Last 3 Encounters:   11/20/17 138/72   06/19/17 128/70   04/03/17 126/70    Weight from Last 3 Encounters:   11/20/17 119.7 kg (264 lb)   06/19/17 119 kg (262 lb 6.4 oz)   04/03/17 119.3 kg (263 lb)              We Performed the Following     Comprehensive metabolic panel     HC FLU VAC " PRESRV FREE QUAD SPLIT VIR 3+YRS IM     PSA, SCREENING     VACCINE ADMINISTRATION, INITIAL     VENOUS COLLECTION          Where to get your medicines      These medications were sent to [id number] - [place], [place] - [address]  [address], [facility] [address]     Phone:  [phone]     lisinopril 40 MG tablet    probenecid 500 MG tablet          Primary Care Provider Office Phone # Fax #    Maco Galindo [phone] [address]  [facility] [address]        Equal Access to Services     [facility]: Hadii aad ku hadasho Soomaali, waaxda luqadaha, qaybta kaalmada adeegyada, waxay anastaciain hayladyn lilia judd . So [facility] [phone].    ATENCIÓN: Si habla español, tiene a collins disposición servicios gratuitos de asistencia lingüística. [facility] [phone].    We comply with applicable federal civil rights laws and [place] laws. We do not discriminate on the basis of race, color, national origin, age, disability, sex, sexual orientation, or gender identity.            Thank you!     Thank you for choosing [facility]  for your care. Our goal is always to provide you with excellent care. Hearing back from our patients is one way we can continue to improve our services. Please take a few minutes to complete the written survey that you may receive in the mail after your visit with us. Thank you!             Your Updated Medication List - Protect others around you: Learn how to safely use, store and throw away your medicines at www.disposemymeds.org.          This list is accurate as of: 11/20/17  4:36 PM.  Always use your most recent med list.                   Brand Name Dispense Instructions for use Diagnosis    ADACEL 5-2-15.5 LF-MCG/0.5 injection   Generic drug:  Tdap (tetanus-diphtheria-acell pertussis)           desonide 0.05 % ointment    DESOWEN    30 g    Apply topically 3 times daily as needed Apply  sparingly to affected area.  Will call when needed    Dermatitis       lisinopril 40 MG tablet    PRINIVIL/ZESTRIL    90 tablet    Take 1 tablet (40 mg) by mouth daily    Essential hypertension, benign       mupirocin 2 % ointment    BACTROBAN     APPLY 1 GRAM INTO EACH NOSTRIL 3 TIMES DAILY FOR 5 DAYS        probenecid 500 MG tablet    BENEMID    180 tablet    TAKE 1 TABLET BY MOUTH TWICE A DAY    Idiopathic chronic gout without tophus, unspecified site

## 2017-11-20 NOTE — PROGRESS NOTES
SUBJECTIVE:                                                    Maco Montes De Oca is a 66 year old male who presents to clinic today for the following health issues:      Hypertension Follow-up      Outpatient blood pressures are not being checked.    Low Salt Diet: no added salt        Amount of exercise or physical activity: 2-3 days/week for an average of 30-45 minutes    Problems taking medications regularly: No    Medication side effects: none    Diet: regular (no restrictions)        psa high last check-here for for recheck    Problem list and histories reviewed & adjusted, as indicated.  Additional history: as documented    Patient Active Problem List   Diagnosis     Essential hypertension, benign     Vesicular palmoplantar eczema     Primary localized osteoarthrosis, other specified sites     ACP (advance care planning)     Health Care Home     Family history of prostate cancer     Dacrocystitis, left     Idiopathic chronic gout without tophus, unspecified site     Past Surgical History:   Procedure Laterality Date     C ANESTH,HIP JOINT SURGERY      slipped epiphysis     HC PHAKIC IOL - IMPLANT FROM SURGEON  2013    right      HC PHAKIC IOL - IMPLANT FROM SURGEON  2013    left eye        Social History   Substance Use Topics     Smoking status: Former Smoker     Quit date: 1992     Smokeless tobacco: Former User     Types: Chew     Alcohol use No     Family History   Problem Relation Age of Onset     HEART DISEASE Mother       age 75     HEART DISEASE Father       mi age 65     Arthritis Brother      lupus         Current Outpatient Prescriptions   Medication Sig Dispense Refill     lisinopril (PRINIVIL/ZESTRIL) 40 MG tablet Take 1 tablet (40 mg) by mouth daily 90 tablet 1     probenecid (BENEMID) 500 MG tablet TAKE 1 TABLET BY MOUTH TWICE A  tablet 1     ADACEL 5-2-15.5 LF-MCG/0.5 injection        mupirocin (BACTROBAN) 2 % ointment APPLY 1 GRAM INTO EACH NOSTRIL 3 TIMES DAILY FOR 5 DAYS   "0     [DISCONTINUED] lisinopril (PRINIVIL/ZESTRIL) 40 MG tablet Take 1 tablet (40 mg) by mouth daily 90 tablet 1     desonide (DESOWEN) 0.05 % ointment Apply topically 3 times daily as needed Apply sparingly to affected area.    Will call when needed 30 g 1     Allergies   Allergen Reactions     Allopurinol      vasculitis, Dr Marin confirmed     Penicillins      Recent Labs   Lab Test  06/19/17   1126  05/24/16   0928  05/05/15   0925  02/07/14   0955   LDL   --   82  74  79   HDL   --   37*  35*  31*   TRIG   --   146  137  139   ALT  17 17 20  18   CR  0.96  0.95  1.11  1.01   GFRESTIMATED  82  84  70  79   POTASSIUM  4.3  4.5  4.4  4.6      BP Readings from Last 3 Encounters:   11/20/17 138/72   06/19/17 128/70   04/03/17 126/70    Wt Readings from Last 3 Encounters:   11/20/17 119.7 kg (264 lb)   06/19/17 119 kg (262 lb 6.4 oz)   04/03/17 119.3 kg (263 lb)                          ROS:  Constitutional, HEENT, cardiovascular, pulmonary, gi and gu systems are negative, except as otherwise noted.      OBJECTIVE:   /72 (BP Location: Right arm, Patient Position: Chair, Cuff Size: Adult Large)  Pulse 64  Temp 98.3  F (36.8  C) (Oral)  Ht 1.753 m (5' 9\")  Wt 119.7 kg (264 lb)  SpO2 98%  BMI 38.99 kg/m2  Body mass index is 38.99 kg/(m^2).   GENERAL: healthy, alert and no distress  NECK: no adenopathy, no asymmetry, masses, or scars and thyroid normal to palpation  RESP: lungs clear to auscultation - no rales, rhonchi or wheezes  CV: regular rate and rhythm, normal S1 S2, no S3 or S4, no murmur, click or rub, no peripheral edema and peripheral pulses strong  ABDOMEN: soft, nontender, no hepatosplenomegaly, no masses and bowel sounds normal  MS: no gross musculoskeletal defects noted, no edema        ASSESSMENT:       PLAN:   (I10) Essential hypertension, benign  (primary encounter diagnosis)  Comment: well controlled  Plan: lisinopril (PRINIVIL/ZESTRIL) 40 MG tablet,         VENOUS COLLECTION, " "Comprehensive metabolic         panel        continue current medications at current doses     (M1A.00X0) Idiopathic chronic gout without tophus, unspecified site  Comment: well controlled  Plan: probenecid (BENEMID) 500 MG tablet        continue current medications at current doses     (R97.20) Elevated prostate specific antigen (PSA)  Comment: recheck today-if high needs to see urology-he will likely go to [place]  Plan: PSA, SCREENING            (Z23) Need for vaccination  Comment:   Plan: HC FLU VAC PRESRV FREE QUAD SPLIT VIR 3+YRS IM,        VACCINE ADMINISTRATION, INITIAL              BMI:   Estimated body mass index is 38.99 kg/(m^2) as calculated from the following:    Height as of this encounter: 1.753 m (5' 9\").    Weight as of this encounter: 119.7 kg (264 lb).   Weight management plan: Discussed healthy diet and exercise guidelines and patient will follow up in 6 months in clinic to re-evaluate.        FUTURE APPOINTMENTS:       - Follow-up visit in 6 mo  Work on weight loss  Regular exercise    Maco Galindo MD  [facility]  "

## 2017-11-21 LAB
ABBOTT PSA - QUEST: 5.8 NG/ML
ALBUMIN SERPL-MCNC: 4.3 G/DL (ref 3.6–5.1)
ALBUMIN/GLOB SERPL: 1.4 (CALC) (ref 1–2.5)
ALP SERPL-CCNC: 64 U/L (ref 40–115)
ALT SERPL-CCNC: 16 U/L (ref 9–46)
AST SERPL-CCNC: 16 U/L (ref 10–35)
BILIRUB SERPL-MCNC: 1.1 MG/DL (ref 0.2–1.2)
BUN SERPL-MCNC: 15 MG/DL (ref 7–25)
BUN/CREATININE RATIO: ABNORMAL (CALC) (ref 6–22)
CALCIUM SERPL-MCNC: 9.5 MG/DL (ref 8.6–10.3)
CHLORIDE SERPLBLD-SCNC: 104 MMOL/L (ref 98–110)
CO2 SERPL-SCNC: 25 MMOL/L (ref 20–31)
CREAT SERPL-MCNC: 0.91 MG/DL (ref 0.7–1.25)
EGFR AFRICAN AMERICAN - QUEST: 101 ML/MIN/1.73M2
GFR SERPL CREATININE-BSD FRML MDRD: 88 ML/MIN/1.73M2
GLOBULIN, CALCULATED - QUEST: 3 G/DL (CALC) (ref 1.9–3.7)
GLUCOSE - QUEST: 119 MG/DL (ref 65–99)
POTASSIUM SERPL-SCNC: 4.1 MMOL/L (ref 3.5–5.3)
PROT SERPL-MCNC: 7.3 G/DL (ref 6.1–8.1)
SODIUM SERPL-SCNC: 139 MMOL/L (ref 135–146)

## 2017-12-05 DIAGNOSIS — I10 ESSENTIAL HYPERTENSION, BENIGN: ICD-10-CM

## 2017-12-05 DIAGNOSIS — M1A.00X0 IDIOPATHIC CHRONIC GOUT WITHOUT TOPHUS, UNSPECIFIED SITE: ICD-10-CM

## 2017-12-05 RX ORDER — LISINOPRIL 40 MG/1
TABLET ORAL
Qty: 90 TABLET | Refills: 1 | OUTPATIENT
Start: 2017-12-05

## 2017-12-05 RX ORDER — PROBENECID 500 MG/1
TABLET, FILM COATED ORAL
Qty: 180 TABLET | Refills: 1 | OUTPATIENT
Start: 2017-12-05

## 2017-12-19 ENCOUNTER — TRANSFERRED RECORDS (OUTPATIENT)
Dept: FAMILY MEDICINE | Facility: CLINIC | Age: 67
End: 2017-12-19

## 2018-04-26 ENCOUNTER — HOSPITAL ENCOUNTER (OUTPATIENT)
Dept: LAB | Facility: CLINIC | Age: 68
Discharge: HOME | End: 2018-04-26
Attending: ORTHOPAEDIC SURGERY | Admitting: ORTHOPAEDIC SURGERY
Payer: MEDICARE

## 2018-04-26 DIAGNOSIS — Z01.812 PRE-OPERATIVE LABORATORY EXAMINATION: ICD-10-CM

## 2018-04-26 LAB
MRSA DNA SPEC QL NAA+PROBE: NEGATIVE
SPECIMEN SOURCE: NORMAL

## 2018-04-26 PROCEDURE — 40000830 ZZHCL STATISTIC STAPH AUREUS METH RESIST SCREEN PCR: Performed by: ORTHOPAEDIC SURGERY

## 2018-04-26 PROCEDURE — 87641 MR-STAPH DNA AMP PROBE: CPT | Performed by: ORTHOPAEDIC SURGERY

## 2018-04-26 PROCEDURE — 40000829 ZZHCL STATISTIC STAPH AUREUS SUSCEPT SCREEN PCR: Performed by: ORTHOPAEDIC SURGERY

## 2018-04-26 PROCEDURE — 87640 STAPH A DNA AMP PROBE: CPT | Performed by: ORTHOPAEDIC SURGERY

## 2018-06-07 ENCOUNTER — OFFICE VISIT (OUTPATIENT)
Dept: FAMILY MEDICINE | Facility: CLINIC | Age: 68
End: 2018-06-07

## 2018-06-07 VITALS
SYSTOLIC BLOOD PRESSURE: 132 MMHG | DIASTOLIC BLOOD PRESSURE: 72 MMHG | HEART RATE: 87 BPM | TEMPERATURE: 98.7 F | HEIGHT: 69 IN | BODY MASS INDEX: 38.98 KG/M2 | OXYGEN SATURATION: 96 % | WEIGHT: 263.2 LBS

## 2018-06-07 DIAGNOSIS — M1A.00X0 IDIOPATHIC CHRONIC GOUT WITHOUT TOPHUS, UNSPECIFIED SITE: ICD-10-CM

## 2018-06-07 DIAGNOSIS — I10 ESSENTIAL HYPERTENSION, BENIGN: Primary | ICD-10-CM

## 2018-06-07 DIAGNOSIS — R97.20 ELEVATED PROSTATE SPECIFIC ANTIGEN (PSA): ICD-10-CM

## 2018-06-07 PROCEDURE — 99213 OFFICE O/P EST LOW 20 MIN: CPT | Performed by: FAMILY MEDICINE

## 2018-06-07 RX ORDER — LISINOPRIL 40 MG/1
40 TABLET ORAL DAILY
Qty: 90 TABLET | Refills: 1 | Status: SHIPPED | OUTPATIENT
Start: 2018-06-07 | End: 2018-12-03

## 2018-06-07 RX ORDER — PROBENECID 500 MG/1
TABLET, FILM COATED ORAL
Qty: 180 TABLET | Refills: 1 | Status: SHIPPED | OUTPATIENT
Start: 2018-06-07 | End: 2018-12-03

## 2018-06-07 NOTE — PROGRESS NOTES
SUBJECTIVE:                                                    Maco Montes De Oca is a 67 year old male who presents to clinic today for the following health issues:      Hypertension Follow-up      Outpatient blood pressures are not being checked.    Low Salt Diet: no added salt      Amount of exercise or physical activity: None as knee DJD bad    Problems taking medications regularly: No    Medication side effects: none    Diet: regular (no restrictions)            Problem list and histories reviewed & adjusted, as indicated.  Additional history: as documented    Patient Active Problem List   Diagnosis     Essential hypertension, benign     Vesicular palmoplantar eczema     Primary localized osteoarthrosis, other specified sites     ACP (advance care planning)     Health Care Home     Family history of prostate cancer     Dacrocystitis, left     Idiopathic chronic gout without tophus, unspecified site     Past Surgical History:   Procedure Laterality Date     C ANESTH,HIP JOINT SURGERY      slipped epiphysis     HC PHAKIC IOL - IMPLANT FROM SURGEON  2013    right      HC PHAKIC IOL - IMPLANT FROM SURGEON  2013    left eye        Social History   Substance Use Topics     Smoking status: Former Smoker     Quit date: 1992     Smokeless tobacco: Former User     Types: Chew     Alcohol use No     Family History   Problem Relation Age of Onset     HEART DISEASE Mother       age 75     HEART DISEASE Father       mi age 65     Arthritis Brother      lupus         Current Outpatient Prescriptions   Medication Sig Dispense Refill     desonide (DESOWEN) 0.05 % ointment Apply topically 3 times daily as needed Apply sparingly to affected area.    Will call when needed 30 g 1     lisinopril (PRINIVIL/ZESTRIL) 40 MG tablet Take 1 tablet (40 mg) by mouth daily 90 tablet 1     probenecid (BENEMID) 500 MG tablet TAKE 1 TABLET BY MOUTH TWICE A  tablet 1     Allergies   Allergen Reactions     Allopurinol       "vasculitis, Dr Marin confirmed     Penicillins      Was a child-doesn't remember     Recent Labs   Lab Test  11/20/17   1132  06/19/17   1126  05/24/16   0928  05/05/15   0925  02/07/14   0955   LDL   --    --   82  74  79   HDL   --    --   37*  35*  31*   TRIG   --    --   146  137  139   ALT  16  17  17  20  18   CR  0.91  0.96  0.95  1.11  1.01   GFRESTIMATED  88  82  84  70  79   POTASSIUM  4.1  4.3  4.5  4.4  4.6      BP Readings from Last 3 Encounters:   06/07/18 132/72   11/20/17 138/72   06/19/17 128/70    Wt Readings from Last 3 Encounters:   06/07/18 119.4 kg (263 lb 3.2 oz)   05/15/18 116.6 kg (257 lb)   11/20/17 119.7 kg (264 lb)                    ROS:  Constitutional, HEENT, cardiovascular, pulmonary, gi and gu systems are negative, except as otherwise noted.    OBJECTIVE:     /72 (BP Location: Left arm, Patient Position: Chair, Cuff Size: Adult Large)  Pulse 87  Temp 98.7  F (37.1  C) (Oral)  Ht 1.74 m (5' 8.5\")  Wt 119.4 kg (263 lb 3.2 oz)  SpO2 96%  BMI 39.44 kg/m2  Body mass index is 39.44 kg/(m^2).   GENERAL: healthy, alert and no distress  NECK: no adenopathy, no asymmetry, masses, or scars and thyroid normal to palpation  RESP: lungs clear to auscultation - no rales, rhonchi or wheezes  CV: regular rate and rhythm, normal S1 S2, no S3 or S4, no murmur, click or rub, no peripheral edema and peripheral pulses strong  ABDOMEN: soft, nontender, no hepatosplenomegaly, no masses and bowel sounds normal  MS: no gross musculoskeletal defects noted, no edema    Diagnostic Test Results:  none     ASSESSMENT:       PLAN:   (I10) Essential hypertension, benign  (primary encounter diagnosis)  Comment: well controlled  Plan: lisinopril (PRINIVIL/ZESTRIL) 40 MG tablet        continue current medications at current doses     (M1A.00X0) Idiopathic chronic gout without tophus, unspecified site  Comment: well controlled  Plan: probenecid (BENEMID) 500 MG tablet        continue current medications at " "current doses     (R97.20) Elevated prostate specific antigen (PSA)  Comment: fiollowed at [place]-had MRI that was ok-will get psa here at [facility] in July  Plan:     BMI:   Estimated body mass index is 39.44 kg/(m^2) as calculated from the following:    Height as of this encounter: 1.74 m (5' 8.5\").    Weight as of this encounter: 119.4 kg (263 lb 3.2 oz).   Weight management plan: Discussed healthy diet and exercise guidelines and patient will follow up in 3 months in clinic to re-evaluate.      FUTURE APPOINTMENTS:       - Follow-up visit in 3 mo  Work on weight loss  Regular exercise    Maco Galindo MD  [facility]      "

## 2018-06-07 NOTE — MR AVS SNAPSHOT
"              After Visit Summary   6/7/2018    Maco Montes De Oca    MRN: [id number]           Patient Information     Date Of Birth          1950        Visit Information        Provider Department      6/7/2018 9:45 AM Maco Galindo MD [facility]        Today's Diagnoses     Essential hypertension, benign    -  1    Idiopathic chronic gout without tophus, unspecified site        Elevated prostate specific antigen (PSA)           Follow-ups after your visit        Who to contact     If you have questions or need follow up information about today's clinic visit or your schedule please contact [facility] directly at [phone].  Normal or non-critical lab and imaging results will be communicated to you by MyChart, letter or phone within 4 business days after the clinic has received the results. If you do not hear from us within 7 days, please contact the clinic through Clarienthart or phone. If you have a critical or abnormal lab result, we will notify you by phone as soon as possible.  Submit refill requests through zeenworld or call your pharmacy and they will forward the refill request to us. Please allow 3 business days for your refill to be completed.          Additional Information About Your Visit        MyChart Information     zeenworld gives you secure access to your electronic health record. If you see a primary care provider, you can also send messages to your care team and make appointments. If you have questions, please call your primary care clinic.  If you do not have a primary care provider, please call [phone] and they will assist you.        Care EveryWhere ID     This is your Care EveryWhere ID. This could be used by other organizations to access your [place] medical records  [id number]        Your Vitals Were     Pulse Temperature Height Pulse Oximetry BMI (Body Mass Index)       87 98.7  F (37.1  C) (Oral) 1.74 m (5' 8.5\") 96% " 39.44 kg/m2        Blood Pressure from Last 3 Encounters:   06/07/18 132/72   11/20/17 138/72   06/19/17 128/70    Weight from Last 3 Encounters:   06/07/18 119.4 kg (263 lb 3.2 oz)   05/15/18 116.6 kg (257 lb)   11/20/17 119.7 kg (264 lb)              Today, you had the following     No orders found for display         Where to get your medicines      These medications were sent to [facility]/pharmacy #[id number] - [place], [place] - [address] [facility]  [address], [facility] [address]     Phone:  [phone]     lisinopril 40 MG tablet    probenecid 500 MG tablet          Primary Care Provider Office Phone # Fax #    Maco Galindo [phone] [address] [facility] [address]  [facility] [address]        Equal Access to Services     [facility]: Hadii aad ku hadasho Soomaali, waaxda luqadaha, qaybta kaalmada adeegyada, waxay anastaciain hayladyn lilia judd . So [facility] [phone].    ATENCIÓN: Si habla español, tiene a collins disposición servicios gratuitos de asistencia lingüística. Lizbethana maria al [phone].    We comply with applicable federal civil rights laws and [place] laws. We do not discriminate on the basis of race, color, national origin, age, disability, sex, sexual orientation, or gender identity.            Thank you!     Thank you for choosing [facility]  for your care. Our goal is always to provide you with excellent care. Hearing back from our patients is one way we can continue to improve our services. Please take a few minutes to complete the written survey that you may receive in the mail after your visit with us. Thank you!             Your Updated Medication List - Protect others around you: Learn how to safely use, store and throw away your medicines at www.disposemymeds.org.          This list is accurate as of 6/7/18  9:59 AM.  Always use your most recent med list.                   Brand Name Dispense Instructions for use Diagnosis    desonide  0.05 % ointment    DESOWEN    30 g    Apply topically 3 times daily as needed Apply sparingly to affected area.  Will call when needed    Dermatitis       lisinopril 40 MG tablet    PRINIVIL/ZESTRIL    90 tablet    Take 1 tablet (40 mg) by mouth daily    Essential hypertension, benign       probenecid 500 MG tablet    BENEMID    180 tablet    TAKE 1 TABLET BY MOUTH TWICE A DAY    Idiopathic chronic gout without tophus, unspecified site

## 2018-06-07 NOTE — NURSING NOTE
Alexandre is here for a med check and also has questions about the shingrix and believes he needs his PSA checked    Pre-Visit Screening :  Immunizations : up to date  Colon Screening : is up to date  Asthma Action Test/Plan : ruel  PHQ9/GAD7 :  Na      Pulse - regular  My Chart - accepts    CLASSIFICATION OF OVERWEIGHT AND OBESITY BY BMI                         Obesity Class           BMI(kg/m2)  Underweight                                    < 18.5  Normal                                         18.5-24.9  Overweight                                     25.0-29.9  OBESITY                     I                  30.0-34.9                              II                 35.0-39.9  EXTREME OBESITY             III                >40                             Patient's  BMI Body mass index is 22.15 kg/(m^2).  http://hin.nhlbi.nih.gov/menuplanner/menu.cgi  Questioned patient about current smoking habits.  Pt. quit smoking some time ago.    The patient has verbalized that it is ok to leave a detailed voice message on the patient's cell phone with results/recommendations from this visit.     Verified [phone] phone number:

## 2018-07-16 ENCOUNTER — OFFICE VISIT (OUTPATIENT)
Dept: FAMILY MEDICINE | Facility: CLINIC | Age: 68
End: 2018-07-16

## 2018-07-16 VITALS
HEIGHT: 69 IN | WEIGHT: 266.2 LBS | DIASTOLIC BLOOD PRESSURE: 72 MMHG | SYSTOLIC BLOOD PRESSURE: 128 MMHG | OXYGEN SATURATION: 98 % | HEART RATE: 70 BPM | BODY MASS INDEX: 39.43 KG/M2 | TEMPERATURE: 98.6 F

## 2018-07-16 DIAGNOSIS — M1A.00X0 IDIOPATHIC CHRONIC GOUT WITHOUT TOPHUS, UNSPECIFIED SITE: ICD-10-CM

## 2018-07-16 DIAGNOSIS — Z80.42 FAMILY HISTORY OF PROSTATE CANCER: ICD-10-CM

## 2018-07-16 DIAGNOSIS — Z01.818 PRE-OP EXAM: ICD-10-CM

## 2018-07-16 DIAGNOSIS — E78.00 PURE HYPERCHOLESTEROLEMIA: ICD-10-CM

## 2018-07-16 DIAGNOSIS — Z00.00 ROUTINE GENERAL MEDICAL EXAMINATION AT A HEALTH CARE FACILITY: Primary | ICD-10-CM

## 2018-07-16 DIAGNOSIS — I10 ESSENTIAL HYPERTENSION, BENIGN: ICD-10-CM

## 2018-07-16 LAB
ERYTHROCYTE [DISTWIDTH] IN BLOOD BY AUTOMATED COUNT: 12.9 %
HCT VFR BLD AUTO: 45.4 % (ref 40–53)
HEMOGLOBIN: 15.3 G/DL (ref 13.3–17.7)
MCH RBC QN AUTO: 31.2 PG (ref 26–33)
MCHC RBC AUTO-ENTMCNC: 33.7 G/DL (ref 31–36)
MCV RBC AUTO: 92.4 FL (ref 78–100)
PLATELET COUNT - QUEST: 326 10^9/L (ref 150–375)
RBC # BLD AUTO: 4.91 10*12/L (ref 4.4–5.9)
WBC # BLD AUTO: 8.7 10*9/L (ref 4–11)

## 2018-07-16 PROCEDURE — 85027 COMPLETE CBC AUTOMATED: CPT | Performed by: FAMILY MEDICINE

## 2018-07-16 PROCEDURE — 80061 LIPID PANEL: CPT | Mod: 90 | Performed by: FAMILY MEDICINE

## 2018-07-16 PROCEDURE — 36415 COLL VENOUS BLD VENIPUNCTURE: CPT | Performed by: FAMILY MEDICINE

## 2018-07-16 PROCEDURE — 93000 ELECTROCARDIOGRAM COMPLETE: CPT | Performed by: FAMILY MEDICINE

## 2018-07-16 PROCEDURE — 80053 COMPREHEN METABOLIC PANEL: CPT | Mod: 90 | Performed by: FAMILY MEDICINE

## 2018-07-16 PROCEDURE — 84550 ASSAY OF BLOOD/URIC ACID: CPT | Mod: 90 | Performed by: FAMILY MEDICINE

## 2018-07-16 PROCEDURE — 99397 PER PM REEVAL EST PAT 65+ YR: CPT | Performed by: FAMILY MEDICINE

## 2018-07-16 PROCEDURE — 84153 ASSAY OF PSA TOTAL: CPT | Mod: 90 | Performed by: FAMILY MEDICINE

## 2018-07-16 NOTE — PROGRESS NOTES
[facility]  [address]  [address]  [facility] [address] [phone]  Dept: [phone]    PRE-OP EVALUATION:  Today's date: 2018    Maco Montes De Oca (: 1950) presents for pre-operative evaluation assessment as requested by Dr. Simmons.  He requires evaluation and anesthesia risk assessment prior to undergoing surgery/procedure for treatment of L TKA .    Proposed Surgery/ Procedure: Left TKA  Date of Surgery/ Procedure: 18  Time of Surgery/ Procedure: 8 AM  Hospital/Surgical Facility: [facility]   Fax number for surgical facility: [phone]  Primary Physician: Maco Galindo  Type of Anesthesia Anticipated: to be determined    Patient has a Health Care Directive or Living Will:  YES     1. NO - Do you have a history of heart attack, stroke, stent, bypass or surgery on an artery in the head, neck, heart or legs?  2. NO - Do you ever have any pain or discomfort in your chest?  3. NO - Do you have a history of  Heart Failure?  4. NO - Are you troubled by shortness of breath when: walking on the level, up a slight hill or at night?  5. NO - Do you currently have a cold, bronchitis or other respiratory infection?  6. NO - Do you have a cough, shortness of breath or wheezing?  7. NO - Do you sometimes get pains in the calves of your legs when you walk?  8. NO - Do you or anyone in your family have previous history of blood clots?  9. NO - Do you or does anyone in your family have a serious bleeding problem such as prolonged bleeding following surgeries or cuts?  10. NO - Have you ever had problems with anemia or been told to take iron pills?  11. NO - Have you had any abnormal blood loss such as black, tarry or bloody stools, or abnormal vaginal bleeding?  12. NO - Have you ever had a blood transfusion?  13. NO - Have you or any of your relatives ever had problems with anesthesia?  14. NO - Do you have sleep apnea, excessive  snoring or daytime drowsiness?  15. NO - Do you have any prosthetic heart valves?  16. NO - Do you have prosthetic joints?  17. NO - Is there any chance that you may be pregnant?      HPI:     HPI related to upcoming procedure: left knee DJD      See problem list for active medical problems.  Problems all longstanding and stable, except as noted/documented.  See ROS for pertinent symptoms related to these conditions.                                                                                                                                                          .  HYPERTENSION - Patient has longstanding history of HTN , currently denies any symptoms referable to elevated blood pressure. Specifically denies chest pain, palpitations, dyspnea, orthopnea, PND or peripheral edema. Blood pressure readings have been in normal range. Current medication regimen is as listed below. Patient denies any side effects of medication.                                                                                                                                                                                          .    MEDICAL HISTORY:     Patient Active Problem List    Diagnosis Date Noted     Idiopathic chronic gout without tophus, unspecified site 11/10/2016     Priority: Medium     Did not tolerate allopurinol, vasculitis, uses probenecid       Dacrocystitis, left 11/04/2015     Priority: Medium     Family history of prostate cancer 02/07/2014     Priority: Medium     Primary localized osteoarthrosis, other specified sites 08/08/2011     Priority: Medium     Vesicular palmoplantar eczema 07/21/2010     Priority: Medium     (Problem list name updated by automated process. Provider to review and confirm.)       Essential hypertension, benign      Priority: Medium     Health Care Home 01/21/2013     Priority: Low     State Tier Level:  Tier 1  Status:  N/A  Care Coordinator:  N/A    See Letters for [facility] Care Plan            ACP (advance care planning) 02/07/2012     Priority: Low     Advance Care Planning:   ACP Review and Resources Provided:  Reviewed chart for advance care plan.  Maco Montes De Oca has no plan or code status on file. Discussed available resources and provided with information. Confirmed code status reflects current choices pending further ACP discussions.  Confirmed/documented designated decision maker(s). See permanent comments section of demographics in clinical tab.   Added by Mariah Cat on 2/7/2014                Past Medical History:   Diagnosis Date     Gout      Osteoarthritis      Past Surgical History:   Procedure Laterality Date     C ANESTH,HIP JOINT SURGERY      slipped epiphysis     HC PHAKIC IOL - IMPLANT FROM SURGEON  2013    right      HC PHAKIC IOL - IMPLANT FROM SURGEON  2013    left eye      Current Outpatient Prescriptions   Medication Sig Dispense Refill     desonide (DESOWEN) 0.05 % ointment Apply topically 3 times daily as needed Apply sparingly to affected area.    Will call when needed 30 g 1     lisinopril (PRINIVIL/ZESTRIL) 40 MG tablet Take 1 tablet (40 mg) by mouth daily 90 tablet 1     probenecid (BENEMID) 500 MG tablet TAKE 1 TABLET BY MOUTH TWICE A  tablet 1     OTC products: None, except as noted above    Allergies   Allergen Reactions     Allopurinol      vasculitis, Dr Marin confirmed     Penicillins      Was a child-doesn't remember      Latex Allergy: NO    Social History   Substance Use Topics     Smoking status: Former Smoker     Quit date: 1/1/1992     Smokeless tobacco: Former User     Types: Chew     Alcohol use No     History   Drug Use No       REVIEW OF SYSTEMS:   CONSTITUTIONAL: NEGATIVE for fever, chills, change in weight  ENT/MOUTH: NEGATIVE for ear, mouth and throat problems  RESP: NEGATIVE for significant cough or SOB  CV: NEGATIVE for chest pain, palpitations or peripheral edema  PSYCHIATRIC: NEGATIVE for changes in mood or affect    EXAM:   /72  "(BP Location: Right arm, Patient Position: Chair, Cuff Size: Adult Large)  Pulse 70  Temp 98.6  F (37  C) (Oral)  Ht 1.74 m (5' 8.5\")  Wt 120.7 kg (266 lb 3.2 oz)  SpO2 98%  BMI 39.89 kg/m2  GENERAL APPEARANCE: healthy, alert and no distress  HENT: ear canals and TM's normal and nose and mouth without ulcers or lesions  RESP: lungs clear to auscultation - no rales, rhonchi or wheezes  CV: regular rate and rhythm, normal S1 S2, no S3 or S4 and no murmur, click or rub   ABDOMEN: soft, nontender, no HSM or masses and bowel sounds normal  NEURO: Normal strength and tone, sensory exam grossly normal, mentation intact and speech normal    DIAGNOSTICS:     EKG: appears normal, NSR, normal axis, normal intervals, no acute ST/T changes c/w ischemia, no LVH by voltage criteria, unchanged from previous tracings  Labs Resulted Today:   Results for orders placed or performed in visit on 07/16/18   HEMOGRAM/PLATELET (BFP)   Result Value Ref Range    WBC 8.7 4.0 - 11 10*9/L    RBC Count 4.91 4.4 - 5.9 10*12/L    Hemoglobin 15.3 13.3 - 17.7 g/dL    Hematocrit 45.4 40.0 - 53.0 %    MCV 92.4 78 - 100 fL    MCH 31.2 26 - 33 pg    MCHC 33.7 31 - 36 g/dL    RDW 12.9 %    Platelet Count 326 150 - 375 10^9/L       Recent Labs   Lab Test  11/20/17   1132  06/19/17   1126   01/21/13   1808   HGB   --    --    --   15.8   PLT   --    --    --   436*   NA  139  141   < >   --    POTASSIUM  4.1  4.3   < >   --    CR  0.91  0.96   < >   --     < > = values in this interval not displayed.        IMPRESSION:   Reason for surgery/procedure: left knee DJD    The proposed surgical procedure is considered INTERMEDIATE risk.    REVISED CARDIAC RISK INDEX  The patient has the following serious cardiovascular risks for perioperative complications such as (MI, PE, VFib and 3  AV Block):  No serious cardiac risks  INTERPRETATION: 0 risks: Class I (very low risk - 0.4% complication rate)    The patient has the following additional risks for " perioperative complications:  No identified additional risks      ICD-10-CM    1. Routine general medical examination at a health care facility Z00.00 Lipid Profile (QUEST)     COMPREHENSIVE METABOLIC PANEL (QUEST) XCMP     HCL PSA, SCREENING (QUEST)     VENOUS COLLECTION     URIC ACID (QUEST)     HEMOGRAM/PLATELET (BFP)   2. Essential hypertension, benign I10 Lipid Profile (QUEST)     COMPREHENSIVE METABOLIC PANEL (QUEST) XCMP     VENOUS COLLECTION   3. Family history of prostate cancer Z80.42 HCL PSA, SCREENING (QUEST)   4. Pure hypercholesterolemia E78.00 Lipid Profile (QUEST)     COMPREHENSIVE METABOLIC PANEL (QUEST) XCMP     VENOUS COLLECTION   5. Idiopathic chronic gout without tophus, unspecified site M1A.00X0 URIC ACID (QUEST)   6. Pre-op exam Z01.818 EKG 12-lead complete w/read - Clinics       RECOMMENDATIONS:           ACE Inhibitor or Angiotensin Receptor Blocker (ARB) Use  Ace inhibitor or Angiotensin Receptor Blocker (ARB) and should HOLD this medication for the 24 hours prior to surgery.      APPROVAL GIVEN to proceed with proposed procedure, without further diagnostic evaluation       Signed Electronically by: Maco Galindo MD    Copy of this evaluation report is provided to requesting physician.    [facility] Preop Guidelines    Revised Cardiac Risk Index

## 2018-07-16 NOTE — PROGRESS NOTES
SUBJECTIVE:   CC: Maco Montes De Oca is an 67 year old male who presents for preventative health visit.     Healthy Habits:    Do you get at least three servings of calcium containing foods daily (dairy, green leafy vegetables, etc.)? yes    Amount of exercise or daily activities, outside of work: 2 day(s) per week    Problems taking medications regularly No    Medication side effects: No    Have you had an eye exam in the past two years? yes    Do you see a dentist twice per year? yes    Do you have sleep apnea, excessive snoring or daytime drowsiness?no       Pt having left TKA-see preop    Today's PHQ-2 Score:   PHQ-2 ( 1999 Pfizer) 7/16/2018 7/16/2018   Q1: Little interest or pleasure in doing things 0 0   Q2: Feeling down, depressed or hopeless 0 0   PHQ-2 Score 0 0       Abuse: Current or Past(Physical, Sexual or Emotional)- No  Do you feel safe in your environment - Yes    Social History   Substance Use Topics     Smoking status: Former Smoker     Quit date: 1/1/1992     Smokeless tobacco: Former User     Types: Chew     Alcohol use No      If you drink alcohol do you typically have >3 drinks per day or >7 drinks per week? No                      Last PSA:   Abbott PSA   Date Value Ref Range Status   11/20/2017 5.8 (H) < OR = 4.0 ng/mL Final     Comment:     The total PSA value from this assay system is   standardized against the WHO standard. The test   result will be approximately 20% lower when compared   to the equimolar-standardized total PSA (Fco   Vidya). Comparison of serial PSA results should be   interpreted with this fact in mind.     This test was performed using the Siemens   chemiluminescent method. Values obtained from   different assay methods cannot be used  interchangeably. PSA levels, regardless of  value, should not be interpreted as absolute  evidence of the presence or absence of disease.         Reviewed orders with patient. Reviewed health maintenance and updated orders accordingly  - Yes  Labs reviewed in EPIC  BP Readings from Last 3 Encounters:   18 128/72   18 132/72   17 138/72    Wt Readings from Last 3 Encounters:   18 120.7 kg (266 lb 3.2 oz)   18 119.4 kg (263 lb 3.2 oz)   05/15/18 116.6 kg (257 lb)                  Patient Active Problem List   Diagnosis     Essential hypertension, benign     Vesicular palmoplantar eczema     Primary localized osteoarthrosis, other specified sites     ACP (advance care planning)     Health Care Home     Family history of prostate cancer     Dacrocystitis, left     Idiopathic chronic gout without tophus, unspecified site     Past Surgical History:   Procedure Laterality Date     C ANESTH,HIP JOINT SURGERY      slipped epiphysis     HC PHAKIC IOL - IMPLANT FROM SURGEON  2013    right      HC PHAKIC IOL - IMPLANT FROM SURGEON  2013    left eye        Social History   Substance Use Topics     Smoking status: Former Smoker     Quit date: 1992     Smokeless tobacco: Former User     Types: Chew     Alcohol use No     Family History   Problem Relation Age of Onset     HEART DISEASE Mother       age 75     HEART DISEASE Father       mi age 65     Arthritis Brother      lupus         Current Outpatient Prescriptions   Medication Sig Dispense Refill     desonide (DESOWEN) 0.05 % ointment Apply topically 3 times daily as needed Apply sparingly to affected area.    Will call when needed 30 g 1     lisinopril (PRINIVIL/ZESTRIL) 40 MG tablet Take 1 tablet (40 mg) by mouth daily 90 tablet 1     probenecid (BENEMID) 500 MG tablet TAKE 1 TABLET BY MOUTH TWICE A  tablet 1     Allergies   Allergen Reactions     Allopurinol      vasculitis, Dr Marin confirmed     Penicillins      Was a child-doesn't remember     Recent Labs   Lab Test  17   1132  17   1126  16   0928  05/05/15   0925  14   0955   LDL   --    --   82  74  79   HDL   --    --   37*  35*  31*   TRIG   --    --   146  137  139   ALT  16   "17  17  20  18   CR  0.91  0.96  0.95  1.11  1.01   GFRESTIMATED  88  82  84  70  79   POTASSIUM  4.1  4.3  4.5  4.4  4.6        Reviewed and updated as needed this visit by clinical staff  Tobacco  Allergies  Meds  Problems         Reviewed and updated as needed this visit by Provider        Past Medical History:   Diagnosis Date     Gout      Osteoarthritis       Past Surgical History:   Procedure Laterality Date     C ANESTH,HIP JOINT SURGERY      slipped epiphysis     HC PHAKIC IOL - IMPLANT FROM SURGEON  2013    right      HC PHAKIC IOL - IMPLANT FROM SURGEON  2013    left eye        ROS:  CONSTITUTIONAL: NEGATIVE for fever, chills, change in weight  INTEGUMENTARY/SKIN: NEGATIVE for worrisome rashes, moles or lesions  EYES: NEGATIVE for vision changes or irritation  ENT: NEGATIVE for ear, mouth and throat problems  RESP: NEGATIVE for significant cough or SOB  CV: NEGATIVE for chest pain, palpitations or peripheral edema  GI: NEGATIVE for nausea, abdominal pain, heartburn, or change in bowel habits   male: negative for dysuria, hematuria, decreased urinary stream, erectile dysfunction, urethral discharge  MUSCULOSKELETAL: NEGATIVE for significant arthralgias or myalgia  NEURO: NEGATIVE for weakness, dizziness or paresthesias  PSYCHIATRIC: NEGATIVE for changes in mood or affect    OBJECTIVE:   /72 (BP Location: Right arm, Patient Position: Chair, Cuff Size: Adult Large)  Pulse 70  Temp 98.6  F (37  C) (Oral)  Ht 1.74 m (5' 8.5\")  Wt 120.7 kg (266 lb 3.2 oz)  SpO2 98%  BMI 39.89 kg/m2  EXAM:  GENERAL: healthy, alert and no distress  EYES: Eyes grossly normal to inspection, PERRL and conjunctivae and sclerae normal  HENT: ear canals and TM's normal, nose and mouth without ulcers or lesions  NECK: no adenopathy, no asymmetry, masses, or scars and thyroid normal to palpation  RESP: lungs clear to auscultation - no rales, rhonchi or wheezes  CV: regular rate and rhythm, normal S1 S2, no S3 or S4, no " murmur, click or rub, no peripheral edema and peripheral pulses strong  ABDOMEN: soft, nontender, no hepatosplenomegaly, no masses and bowel sounds normal   (male): normal male genitalia without lesions or urethral discharge, no hernia  RECTAL (male): deferred  MS: no gross musculoskeletal defects noted, no edema  SKIN: no suspicious lesions or rashes  NEURO: Normal strength and tone, mentation intact and speech normal  PSYCH: mentation appears normal, affect normal/bright  LYMPH: no cervical, supraclavicular, axillary, or inguinal adenopathy        ASSESSMENT/PLAN:   (Z00.00) Routine general medical examination at a health care facility  (primary encounter diagnosis)  Comment: discussed preventitive healthcare   Plan: Lipid Profile ([facility]), COMPREHENSIVE METABOLIC         PANEL (QUEST) XCMP, HCL PSA, SCREENING (QUEST),        VENOUS COLLECTION, URIC ACID (QUEST),         HEMOGRAM/PLATELET (BFP)        Continue to work on healthy diet and exercise, discussed healthy habits     (I10) Essential hypertension, benign  Comment: well controlled  Plan: Lipid Profile (QUEST), COMPREHENSIVE METABOLIC         PANEL (QUEST) XCMP, VENOUS COLLECTION        continue current medications at current doses     (Z80.42) Family history of prostate cancer  Comment:   Plan: HCL PSA, SCREENING (QUEST)            (E78.00) Pure hypercholesterolemia  Comment: low HDL  Plan: Lipid Profile (QUEST), COMPREHENSIVE METABOLIC         PANEL (QUEST) XCMP, VENOUS COLLECTION        Continue to work on healthy diet and exercise, discussed healthy habits     (M1A.00X0) Idiopathic chronic gout without tophus, unspecified site  Comment: stable off meds, no flares  Plan: URIC ACID (QUEST)            (Z01.818) Pre-op exam  Comment: see preop  Plan: EKG 12-lead complete w/read - Clinics              COUNSELING:  Reviewed preventive health counseling, as reflected in patient instructions       Regular exercise       Healthy diet/nutrition       Vision  "screening       Immunizations    Recommend Shingrix             Colon cancer screening       Prostate cancer screening    BP Readings from Last 1 Encounters:   07/16/18 128/72     Estimated body mass index is 39.89 kg/(m^2) as calculated from the following:    Height as of this encounter: 1.74 m (5' 8.5\").    Weight as of this encounter: 120.7 kg (266 lb 3.2 oz).      Weight management plan: Discussed healthy diet and exercise guidelines and patient will follow up in 12 months in clinic to re-evaluate.     reports that he quit smoking about 26 years ago. He has quit using smokeless tobacco. His smokeless tobacco use included Chew.      Counseling Resources:  ATP IV Guidelines  Pooled Cohorts Equation Calculator  FRAX Risk Assessment  ICSI Preventive Guidelines  Dietary Guidelines for Americans, 2010  USDA's MyPlate  ASA Prophylaxis  Lung CA Screening    Maco Galindo MD  [facility]  "

## 2018-07-16 NOTE — NURSING NOTE
Alexandre is here for a CPX and  Pre op 8-1-18    Patient is here for a full physical exam.    Pre-Visit Screening :  Immunizations : up to date  Colon Screening : is up to date  Asthma Action Test/Plan : na  PHQ9/GAD7 :  phq2    Vitals:  Pulse - regular  My Chart - accepts    CLASSIFICATION OF OVERWEIGHT AND OBESITY BY BMI                         Obesity Class           BMI(kg/m2)  Underweight                                    < 18.5  Normal                                         18.5-24.9  Overweight                                     25.0-29.9  OBESITY                     I                  30.0-34.9                              II                 35.0-39.9  EXTREME OBESITY             III                >40                             Patient's  BMI There is no height or weight on file to calculate BMI.  http://hin.nhlbi.nih.gov/menuplanner/menu.cgi  Questioned patient about current smoking habits.  Pt. quit smoking some time ago.    ETOH screening:  Questions:  1-How often do you have a drink containing alcohol?                             0 times per year(s)  2-How many drinks containing alcohol do you have on a typical day when you are         Drinking?                              0   3- How often do you have 5 or more drinks on one occasion?                              0 per year    Have you ever:  None of the patient's responses to the CAGE screening were positive / Negative CAGE score     The patient has verbalized that it is ok to leave a detailed voice message on the patient's cell phone with results/recommendations from this visit.       Verified [phone]  phone number:

## 2018-07-16 NOTE — MR AVS SNAPSHOT
After Visit Summary   7/16/2018    Maco Montes De Oca    MRN: [id number]           Patient Information     Date Of Birth          1950        Visit Information        Provider Department      7/16/2018 10:30 AM Maco Galindo MD [facility]        Today's Diagnoses     Routine general medical examination at a health care facility    -  1    Essential hypertension, benign        Family history of prostate cancer        Pure hypercholesterolemia        Idiopathic chronic gout without tophus, unspecified site        Pre-op exam           Follow-ups after your visit        Who to contact     If you have questions or need follow up information about today's clinic visit or your schedule please contact [facility] directly at [phone].  Normal or non-critical lab and imaging results will be communicated to you by [employer], letter or phone within 4 business days after the clinic has received the results. If you do not hear from us within 7 days, please contact the clinic through [employer] or phone. If you have a critical or abnormal lab result, we will notify you by phone as soon as possible.  Submit refill requests through [employer] or call your pharmacy and they will forward the refill request to us. Please allow 3 business days for your refill to be completed.          Additional Information About Your Visit        MyChart Information     [employer] gives you secure access to your electronic health record. If you see a primary care provider, you can also send messages to your care team and make appointments. If you have questions, please call your primary care clinic.  If you do not have a primary care provider, please call [phone] and they will assist you.        Care EveryWhere ID     This is your Care EveryWhere ID. This could be used by other organizations to access your [facility] medical records  [id number]        Your Vitals Were      "Pulse Temperature Height Pulse Oximetry BMI (Body Mass Index)       70 98.6  F (37  C) (Oral) 1.74 m (5' 8.5\") 98% 39.89 kg/m2        Blood Pressure from Last 3 Encounters:   07/16/18 128/72   06/07/18 132/72   11/20/17 138/72    Weight from Last 3 Encounters:   07/16/18 120.7 kg (266 lb 3.2 oz)   06/07/18 119.4 kg (263 lb 3.2 oz)   05/15/18 116.6 kg (257 lb)              We Performed the Following     COMPREHENSIVE METABOLIC PANEL (QUEST) XCMP     EKG 12-lead complete w/read - Clinics     HCL PSA, SCREENING (QUEST)     HEMOGRAM/PLATELET (BFP)     Lipid Profile (QUEST)     URIC ACID (QUEST)     VENOUS COLLECTION        Primary Care Provider Office Phone # Fax #    Maco Colt Galindo [phone] [address] [address]        Equal Access to Services     FRANKLIN DIGGS : Hadii aad ku hadasho Soomaali, waaxda luqadaha, qaybta kaalmada adeegyada, waxay idiin hayjacinto judd . So [facility] [phone].    ATENCIÓN: Si habla español, tiene a collins disposición servicios gratuitos de asistencia lingüística. [facility] [phone].    We comply with applicable federal civil rights laws and [place] laws. We do not discriminate on the basis of race, color, national origin, age, disability, sex, sexual orientation, or gender identity.            Thank you!     Thank you for choosing [facility]  for your care. Our goal is always to provide you with excellent care. Hearing back from our patients is one way we can continue to improve our services. Please take a few minutes to complete the written survey that you may receive in the mail after your visit with us. Thank you!             Your Updated Medication List - Protect others around you: Learn how to safely use, store and throw away your medicines at www.disposemymeds.org.          This list is accurate as of 7/16/18 11:42 AM.  Always use your most recent med list.                   Brand Name " Dispense Instructions for use Diagnosis    desonide 0.05 % ointment    DESOWEN    30 g    Apply topically 3 times daily as needed Apply sparingly to affected area.  Will call when needed    Dermatitis       lisinopril 40 MG tablet    PRINIVIL/ZESTRIL    90 tablet    Take 1 tablet (40 mg) by mouth daily    Essential hypertension, benign       probenecid 500 MG tablet    BENEMID    180 tablet    TAKE 1 TABLET BY MOUTH TWICE A DAY    Idiopathic chronic gout without tophus, unspecified site

## 2018-07-17 LAB
ABBOTT PSA - QUEST: 5.2 NG/ML
ALBUMIN SERPL-MCNC: 4.5 G/DL (ref 3.6–5.1)
ALBUMIN/GLOB SERPL: 1.6 (CALC) (ref 1–2.5)
ALP SERPL-CCNC: 60 U/L (ref 40–115)
ALT SERPL-CCNC: 16 U/L (ref 9–46)
AST SERPL-CCNC: 16 U/L (ref 10–35)
BILIRUB SERPL-MCNC: 1 MG/DL (ref 0.2–1.2)
BUN SERPL-MCNC: 22 MG/DL (ref 7–25)
BUN/CREATININE RATIO: ABNORMAL (CALC) (ref 6–22)
CALCIUM SERPL-MCNC: 9.5 MG/DL (ref 8.6–10.3)
CHLORIDE SERPLBLD-SCNC: 105 MMOL/L (ref 98–110)
CHOLEST SERPL-MCNC: 167 MG/DL
CHOLEST/HDLC SERPL: 4.4 (CALC)
CO2 SERPL-SCNC: 24 MMOL/L (ref 20–31)
CREAT SERPL-MCNC: 0.89 MG/DL (ref 0.7–1.25)
EGFR AFRICAN AMERICAN - QUEST: 103 ML/MIN/1.73M2
GFR SERPL CREATININE-BSD FRML MDRD: 88 ML/MIN/1.73M2
GLOBULIN, CALCULATED - QUEST: 2.9 G/DL (CALC) (ref 1.9–3.7)
GLUCOSE - QUEST: 113 MG/DL (ref 65–99)
HDLC SERPL-MCNC: 38 MG/DL
LDLC SERPL CALC-MCNC: 108 MG/DL (CALC)
NONHDLC SERPL-MCNC: 129 MG/DL (CALC)
POTASSIUM SERPL-SCNC: 4.3 MMOL/L (ref 3.5–5.3)
PROT SERPL-MCNC: 7.4 G/DL (ref 6.1–8.1)
SODIUM SERPL-SCNC: 140 MMOL/L (ref 135–146)
TRIGL SERPL-MCNC: 110 MG/DL
URATE SERPL-MCNC: 6.2 MG/DL (ref 4–8)

## 2018-08-03 ENCOUNTER — DOCUMENTATION ONLY (OUTPATIENT)
Dept: OTHER | Facility: CLINIC | Age: 68
End: 2018-08-03

## 2018-12-03 DIAGNOSIS — M1A.00X0 IDIOPATHIC CHRONIC GOUT WITHOUT TOPHUS, UNSPECIFIED SITE: ICD-10-CM

## 2018-12-03 DIAGNOSIS — I10 ESSENTIAL HYPERTENSION, BENIGN: ICD-10-CM

## 2018-12-03 RX ORDER — LISINOPRIL 40 MG/1
TABLET ORAL
Qty: 30 TABLET | Refills: 0 | COMMUNITY
Start: 2018-12-03 | End: 2018-12-31

## 2018-12-03 RX ORDER — PROBENECID 500 MG/1
TABLET, FILM COATED ORAL
Qty: 60 TABLET | Refills: 0 | COMMUNITY
Start: 2018-12-03 | End: 2018-12-31

## 2018-12-03 NOTE — TELEPHONE ENCOUNTER
Ok refill of probenecid and lisinopril for one month only called into [facility]. Pt needs non fasting OV for further refills.     Thanks,Mariah      [phone]

## 2018-12-31 ENCOUNTER — OFFICE VISIT (OUTPATIENT)
Dept: FAMILY MEDICINE | Facility: CLINIC | Age: 68
End: 2018-12-31

## 2018-12-31 VITALS
TEMPERATURE: 99 F | WEIGHT: 262.6 LBS | SYSTOLIC BLOOD PRESSURE: 126 MMHG | HEART RATE: 76 BPM | DIASTOLIC BLOOD PRESSURE: 78 MMHG | OXYGEN SATURATION: 96 % | BODY MASS INDEX: 38.89 KG/M2 | HEIGHT: 69 IN

## 2018-12-31 DIAGNOSIS — Z80.42 FAMILY HISTORY OF PROSTATE CANCER: Primary | ICD-10-CM

## 2018-12-31 DIAGNOSIS — M1A.00X0 IDIOPATHIC CHRONIC GOUT WITHOUT TOPHUS, UNSPECIFIED SITE: ICD-10-CM

## 2018-12-31 DIAGNOSIS — I10 ESSENTIAL HYPERTENSION, BENIGN: ICD-10-CM

## 2018-12-31 PROCEDURE — G0008 ADMIN INFLUENZA VIRUS VAC: HCPCS | Performed by: FAMILY MEDICINE

## 2018-12-31 PROCEDURE — 90686 IIV4 VACC NO PRSV 0.5 ML IM: CPT | Performed by: FAMILY MEDICINE

## 2018-12-31 PROCEDURE — 99213 OFFICE O/P EST LOW 20 MIN: CPT | Performed by: FAMILY MEDICINE

## 2018-12-31 PROCEDURE — 36415 COLL VENOUS BLD VENIPUNCTURE: CPT | Performed by: FAMILY MEDICINE

## 2018-12-31 PROCEDURE — G0103 PSA SCREENING: HCPCS | Performed by: FAMILY MEDICINE

## 2018-12-31 PROCEDURE — 80048 BASIC METABOLIC PNL TOTAL CA: CPT | Mod: 90 | Performed by: FAMILY MEDICINE

## 2018-12-31 RX ORDER — LISINOPRIL 40 MG/1
40 TABLET ORAL DAILY
Qty: 90 TABLET | Refills: 1 | Status: SHIPPED | OUTPATIENT
Start: 2018-12-31 | End: 2019-05-29

## 2018-12-31 RX ORDER — PROBENECID 500 MG/1
TABLET, FILM COATED ORAL
Qty: 90 TABLET | Refills: 1 | Status: SHIPPED | OUTPATIENT
Start: 2018-12-31 | End: 2019-05-29

## 2018-12-31 ASSESSMENT — MIFFLIN-ST. JEOR: SCORE: 1943.59

## 2018-12-31 NOTE — PROGRESS NOTES
SUBJECTIVE:                                                    Maco Montes De Oca is a 68 year old male who presents to clinic today for the following health issues:      Hypertension Follow-up      Outpatient blood pressures are not being checked.    Low Salt Diet: no added salt    Gout-no flares    Amount of exercise or physical activity: 4-5 days/week for an average of greater than 60 minutes    Problems taking medications regularly: No    Medication side effects: none    Diet: regular (no restrictions)            Problem list and histories reviewed & adjusted, as indicated.  Additional history: as documented    Patient Active Problem List   Diagnosis     Essential hypertension, benign     Vesicular palmoplantar eczema     Primary localized osteoarthrosis, other specified sites     Health Care Home     Family history of prostate cancer     Dacrocystitis, left     Idiopathic chronic gout without tophus, unspecified site     Past Surgical History:   Procedure Laterality Date     C ANESTH,HIP JOINT SURGERY      slipped epiphysis     HC PHAKIC IOL - IMPLANT FROM SURGEON  2013    right      HC PHAKIC IOL - IMPLANT FROM SURGEON  2013    left eye        Social History     Tobacco Use     Smoking status: Former Smoker     Last attempt to quit: 1992     Years since quittin.0     Smokeless tobacco: Former User     Types: Chew   Substance Use Topics     Alcohol use: No     Family History   Problem Relation Age of Onset     Heart Disease Mother          age 75     Heart Disease Father          mi age 65     Arthritis Brother         lupus         Current Outpatient Medications   Medication Sig Dispense Refill     desonide (DESOWEN) 0.05 % ointment Apply topically 3 times daily as needed Apply sparingly to affected area.    Will call when needed 30 g 1     lisinopril (PRINIVIL/ZESTRIL) 40 MG tablet Take 1 tablet (40 mg) by mouth daily 90 tablet 1     probenecid (BENEMID) 500 MG tablet TAKE 1 TABLET BY MOUTH  "TWICE A DAY 90 tablet 1     Allergies   Allergen Reactions     Allopurinol      vasculitis, Dr Marin confirmed     Penicillins      Was a child-doesn't remember     Recent Labs   Lab Test 07/16/18  1122 11/20/17  1132 06/19/17  1126 05/24/16  0928 05/05/15  0925   *  --   --  82 74   HDL 38*  --   --  37* 35*   TRIG 110  --   --  146 137   ALT 16 16 17 17 20   CR 0.89 0.91 0.96 0.95 1.11   GFRESTIMATED 88 88 82 84 70   POTASSIUM 4.3 4.1 4.3 4.5 4.4      BP Readings from Last 3 Encounters:   12/31/18 126/78   07/16/18 128/72   06/07/18 132/72    Wt Readings from Last 3 Encounters:   12/31/18 119.1 kg (262 lb 9.6 oz)   07/16/18 120.7 kg (266 lb 3.2 oz)   06/07/18 119.4 kg (263 lb 3.2 oz)                    ROS:  Constitutional, HEENT, cardiovascular, pulmonary, gi and gu systems are negative, except as otherwise noted.    OBJECTIVE:     /78 (BP Location: Right arm, Patient Position: Sitting, Cuff Size: Adult Large)   Pulse 76   Temp 99  F (37.2  C) (Oral)   Ht 1.74 m (5' 8.5\")   Wt 119.1 kg (262 lb 9.6 oz)   SpO2 96%   BMI 39.35 kg/m    Body mass index is 39.35 kg/m .   GENERAL: healthy, alert and no distress  NECK: no adenopathy, no asymmetry, masses, or scars and thyroid normal to palpation  RESP: lungs clear to auscultation - no rales, rhonchi or wheezes  CV: regular rate and rhythm, normal S1 S2, no S3 or S4, no murmur, click or rub, no peripheral edema and peripheral pulses strong  ABDOMEN: soft, nontender, no hepatosplenomegaly, no masses and bowel sounds normal  MS: no gross musculoskeletal defects noted, no edema        ASSESSMENT:       PLAN:   (Z80.42) Family history of prostate cancer  (primary encounter diagnosis)  Comment: recheck elevated psa-pt dpoes see [place] [facility] as well  Plan: HCL PSA, SCREENING (QUEST), VENOUS COLLECTION            (M1A.00X0) Idiopathic chronic gout without tophus, unspecified site  Comment: stable no flares  Plan: probenecid (BENEMID) 500 MG tablet        " "continue current medications at current doses     (I10) Essential hypertension, benign  Comment: well controlled  Plan: lisinopril (PRINIVIL/ZESTRIL) 40 MG tablet,         BASIC METABOLIC PANEL (QUEST), VENOUS         COLLECTION        continue current medications at current doses       Pt does state ortho wants him to take clindamycon for dental work-wonders if still necessary-discussed that evidence is ?-would continue for now but may readdress    BMI:   Estimated body mass index is 39.35 kg/m  as calculated from the following:    Height as of this encounter: 1.74 m (5' 8.5\").    Weight as of this encounter: 119.1 kg (262 lb 9.6 oz).   Weight management plan: Discussed healthy diet and exercise guidelines      FUTURE APPOINTMENTS:       - Follow-up visit in 6 mo  Work on weight loss  Regular exercise    Maco Galindo MD  [facility]      "

## 2018-12-31 NOTE — NURSING NOTE
Alexandre is here today for a non-fasting med recheck.    Pre-visit Screening:  Immunizations:  up to date  Colonoscopy:  is up to date  Mammogram: NA  Asthma Action Test/Plan:  AMANDA  PHQ9:  NA  GAD7:  NA  Questioned patient about current smoking habits Pt. quit smoking some time ago.  Ok to leave detailed message on voice mail for today's visit only Yes, phone # [phone]

## 2019-04-08 DIAGNOSIS — M1A.00X0 IDIOPATHIC CHRONIC GOUT WITHOUT TOPHUS, UNSPECIFIED SITE: ICD-10-CM

## 2019-04-08 RX ORDER — PROBENECID 500 MG/1
TABLET, FILM COATED ORAL
Qty: 180 TABLET | Refills: 0 | Status: SHIPPED | OUTPATIENT
Start: 2019-04-08 | End: 2019-05-29

## 2019-04-08 NOTE — TELEPHONE ENCOUNTER
Pending Prescriptions:                       Disp   Refills    probenecid (BENEMID) 500 MG tablet [Pharm*180 ta*             Sig: TAKE 1 TABLET BY MOUTH TWICE A DAY    JCC please review:    Per notes on refill on 12-   It was only refilled for 90 days 1 refills but the notes say take two a day...  Per notes in chart 12- rtc in 6 months Due in June 2019  Please review change qty fax and close encounter  Deisi  [phone] (home)

## 2019-04-09 DIAGNOSIS — M1A.00X0 IDIOPATHIC CHRONIC GOUT WITHOUT TOPHUS, UNSPECIFIED SITE: ICD-10-CM

## 2019-04-09 RX ORDER — PROBENECID 500 MG/1
TABLET, FILM COATED ORAL
Qty: 90 TABLET | Refills: 0 | OUTPATIENT
Start: 2019-04-09

## 2019-04-09 NOTE — TELEPHONE ENCOUNTER
Refused Prescriptions:                       Disp   Refills    probenecid (BENEMID) 500 MG tablet [Pharma*90 tab*0        Sig: TAKE 1 TABLET BY MOUTH TWICE A DAY  Refused By: DEISI CHIU  Reason for Refusal: Request already responded to by other means (phone, fax, etc.)  Reason for Refusal Comment: refilled yesterday     Deisi  [phone] (home)

## 2019-05-07 ENCOUNTER — TRANSFERRED RECORDS (OUTPATIENT)
Dept: FAMILY MEDICINE | Facility: CLINIC | Age: 69
End: 2019-05-07

## 2019-05-29 ENCOUNTER — OFFICE VISIT (OUTPATIENT)
Dept: FAMILY MEDICINE | Facility: CLINIC | Age: 69
End: 2019-05-29

## 2019-05-29 VITALS
BODY MASS INDEX: 37.8 KG/M2 | SYSTOLIC BLOOD PRESSURE: 130 MMHG | OXYGEN SATURATION: 95 % | HEART RATE: 96 BPM | WEIGHT: 255.2 LBS | DIASTOLIC BLOOD PRESSURE: 76 MMHG | HEIGHT: 69 IN | TEMPERATURE: 98.7 F

## 2019-05-29 DIAGNOSIS — R73.03 PREDIABETES: ICD-10-CM

## 2019-05-29 DIAGNOSIS — I10 ESSENTIAL HYPERTENSION, BENIGN: ICD-10-CM

## 2019-05-29 DIAGNOSIS — Z80.42 FAMILY HISTORY OF PROSTATE CANCER: ICD-10-CM

## 2019-05-29 DIAGNOSIS — Z00.00 ROUTINE GENERAL MEDICAL EXAMINATION AT A HEALTH CARE FACILITY: Primary | ICD-10-CM

## 2019-05-29 DIAGNOSIS — E66.01 MORBID OBESITY (H): ICD-10-CM

## 2019-05-29 DIAGNOSIS — M1A.00X0 IDIOPATHIC CHRONIC GOUT WITHOUT TOPHUS, UNSPECIFIED SITE: ICD-10-CM

## 2019-05-29 LAB
ALBUMIN SERPL-MCNC: 4.4 G/DL (ref 3.6–5.1)
ALP SERPL-CCNC: 84 U/L (ref 40–115)
ALT 1742-6: 8 U/L (ref 9–46)
AST 1920-8: 11 U/L (ref 10–35)
BILIRUB SERPL-MCNC: 1.6 MG/DL (ref 0.2–1.2)
BUN SERPL-MCNC: 17 MG/DL (ref 7–25)
CALCIUM SERPL-MCNC: 9.9 MG/DL (ref 8.6–10.3)
CHLORIDE SERPLBLD-SCNC: 107.4 MMOL/L (ref 98–110)
CHOLEST SERPL-MCNC: 142 MG/DL (ref 0–199)
CHOLEST/HDLC SERPL: 4 {RATIO} (ref 0–5)
CO2 SERPL-SCNC: 22.8 MMOL/L (ref 20–32)
CREAT SERPL-MCNC: 0.89 MG/DL (ref 0.7–1.18)
GLUCOSE SERPL-MCNC: 118 MG/DL (ref 60–99)
HDLC SERPL-MCNC: 36 MG/DL (ref 40–150)
LDLC SERPL CALC-MCNC: 79 MG/DL (ref 0–99)
POTASSIUM SERPL-SCNC: 4.56 MMOL/L (ref 3.5–5.3)
PROT SERPL-MCNC: 7.5 G/DL (ref 6.1–8.1)
SODIUM SERPL-SCNC: 141.7 MMOL/L (ref 135–146)
TRIGL SERPL-MCNC: 135 MG/DL (ref 0–149)

## 2019-05-29 PROCEDURE — 99397 PER PM REEVAL EST PAT 65+ YR: CPT | Performed by: FAMILY MEDICINE

## 2019-05-29 PROCEDURE — 80053 COMPREHEN METABOLIC PANEL: CPT | Performed by: FAMILY MEDICINE

## 2019-05-29 PROCEDURE — 80061 LIPID PANEL: CPT | Performed by: FAMILY MEDICINE

## 2019-05-29 PROCEDURE — 36415 COLL VENOUS BLD VENIPUNCTURE: CPT | Performed by: FAMILY MEDICINE

## 2019-05-29 RX ORDER — LISINOPRIL 40 MG/1
40 TABLET ORAL DAILY
Qty: 90 TABLET | Refills: 1 | Status: SHIPPED | OUTPATIENT
Start: 2019-05-29 | End: 2019-12-17

## 2019-05-29 RX ORDER — PROBENECID 500 MG/1
500 TABLET, FILM COATED ORAL 2 TIMES DAILY
Qty: 180 TABLET | Refills: 1 | Status: SHIPPED | OUTPATIENT
Start: 2019-05-29 | End: 2020-05-08

## 2019-05-29 SDOH — HEALTH STABILITY: MENTAL HEALTH: HOW OFTEN DO YOU HAVE A DRINK CONTAINING ALCOHOL?: NEVER

## 2019-05-29 ASSESSMENT — MIFFLIN-ST. JEOR: SCORE: 1910.02

## 2019-05-29 NOTE — NURSING NOTE
Alexandre is here for CPX fasting wants PS done with haylee today    Patient is here for a full physical exam.    Pre-Visit Screening :  Immunizations : up to date  Colon Screening : is up to date  Mammogram: NA  Asthma Action Test/Plan : NA  PHQ9 :  None  GAD7 :  None  Patient's  BMI Body mass index is 38.24 kg/m .  Questioned patient about current smoking habits.  Pt. quit smoking some time ago.  OK to leave a detailed voice message regarding today's visit Yes, phone # [phone]      ETOH screening: Updated

## 2019-05-29 NOTE — PROGRESS NOTES
SUBJECTIVE:   CC: Maco Montes De Oca is an 68 year old male who presents for preventive health visit.     Healthy Habits:    Do you get at least three servings of calcium containing foods daily (dairy, green leafy vegetables, etc.)? yes    Amount of exercise or daily activities, outside of work: 3 day(s) per week    Problems taking medications regularly No    Medication side effects: No    Have you had an eye exam in the past two years? no    Do you see a dentist twice per year? yes    Do you have sleep apnea, excessive snoring or daytime drowsiness?no          Today's PHQ-2 Score:   PHQ-2 (  [employer]) 2019   Q1: Little interest or pleasure in doing things 0 0   Q2: Feeling down, depressed or hopeless 0 0   PHQ-2 Score 0 0       Abuse: Current or Past(Physical, Sexual or Emotional)- No  Do you feel safe in your environment? Yes    Social History     Tobacco Use     Smoking status: Former Smoker     Last attempt to quit: 1992     Years since quittin.4     Smokeless tobacco: Former User     Types: Chew   Substance Use Topics     Alcohol use: No     Frequency: Never     If you drink alcohol do you typically have >3 drinks per day or >7 drinks per week? No                      Last PSA:   [facility] PSA   Date Value Ref Range Status   2018 4.4 (H) < OR = 4.0 ng/mL Final     Comment:     The total PSA value from this assay system is   standardized against the WHO standard. The test   result will be approximately 20% lower when compared   to the equimolar-standardized total PSA ([facility]   [place]). Comparison of serial PSA results should be   interpreted with this fact in mind.     This test was performed using the Siemens   chemiluminescent method. Values obtained from   different assay methods cannot be used  interchangeably. PSA levels, regardless of  value, should not be interpreted as absolute  evidence of the presence or absence of disease.         Reviewed orders with patient. Reviewed  health maintenance and updated orders accordingly - Yes  BP Readings from Last 3 Encounters:   19 130/76   18 126/78   18 128/72    Wt Readings from Last 3 Encounters:   19 115.8 kg (255 lb 3.2 oz)   18 119.1 kg (262 lb 9.6 oz)   18 120.7 kg (266 lb 3.2 oz)                  Patient Active Problem List   Diagnosis     Essential hypertension, benign     Vesicular palmoplantar eczema     Primary localized osteoarthrosis, other specified sites     ACP (advance care planning)     Health Care Home     Family history of prostate cancer     Dacrocystitis, left     Idiopathic chronic gout without tophus, unspecified site     Obesity (BMI 35.0-39.9) with comorbidity (H)     Prediabetes     Past Surgical History:   Procedure Laterality Date     C ANESTH,HIP JOINT SURGERY      slipped epiphysis     HC PHAKIC IOL - IMPLANT FROM SURGEON  2013    right      HC PHAKIC IOL - IMPLANT FROM SURGEON  2013    left eye        Social History     Tobacco Use     Smoking status: Former Smoker     Last attempt to quit: 1992     Years since quittin.4     Smokeless tobacco: Former User     Types: Chew   Substance Use Topics     Alcohol use: No     Frequency: Never     Family History   Problem Relation Age of Onset     Heart Disease Mother          age 75     Heart Disease Father          mi age 65     Arthritis Brother         lupus         Current Outpatient Medications   Medication Sig Dispense Refill     lisinopril (PRINIVIL/ZESTRIL) 40 MG tablet Take 1 tablet (40 mg) by mouth daily 90 tablet 1     probenecid (BENEMID) 500 MG tablet TAKE 1 TABLET BY MOUTH TWICE A  tablet 0     desonide (DESOWEN) 0.05 % ointment Apply topically 3 times daily as needed Apply sparingly to affected area.    Will call when needed 30 g 1     Allergies   Allergen Reactions     Allopurinol      vasculitis, Dr Marin confirmed     Penicillins      Was a child-doesn't remember     Recent Labs   Lab Test  "12/31/18  0810 07/16/18  1122 11/20/17  1132 06/19/17  1126 05/24/16  0928 05/05/15  0925   LDL  --  108*  --   --  82 74   HDL  --  38*  --   --  37* 35*   TRIG  --  110  --   --  146 137   ALT  --  16 16 17 17 20   CR 0.83 0.89 0.91 0.96 0.95 1.11   GFRESTIMATED 90 88 88 82 84 70   POTASSIUM 4.2 4.3 4.1 4.3 4.5 4.4        Reviewed and updated as needed this visit by clinical staff  Tobacco  Allergies  Problems         Reviewed and updated as needed this visit by Provider        Past Medical History:   Diagnosis Date     Gout      Osteoarthritis       Past Surgical History:   Procedure Laterality Date     C ANESTH,HIP JOINT SURGERY      slipped epiphysis     HC PHAKIC IOL - IMPLANT FROM SURGEON  2013    right      HC PHAKIC IOL - IMPLANT FROM SURGEON  2013    left eye        ROS:  CONSTITUTIONAL: NEGATIVE for fever, chills, change in weight  INTEGUMENTARY/SKIN: NEGATIVE for worrisome rashes, moles or lesions  EYES: NEGATIVE for vision changes or irritation  ENT: NEGATIVE for ear, mouth and throat problems  RESP: NEGATIVE for significant cough or SOB  CV: NEGATIVE for chest pain, palpitations or peripheral edema  GI: NEGATIVE for nausea, abdominal pain, heartburn, or change in bowel habits   male: negative for dysuria, hematuria, decreased urinary stream, erectile dysfunction, urethral discharge  MUSCULOSKELETAL: NEGATIVE for significant arthralgias or myalgia  NEURO: NEGATIVE for weakness, dizziness or paresthesias  ENDOCRINE: NEGATIVE for temperature intolerance, skin/hair changes  HEME/ALLERGY/IMMUNE: NEGATIVE for bleeding problems  PSYCHIATRIC: NEGATIVE for changes in mood or affect    OBJECTIVE:   /76 (BP Location: Right arm, Patient Position: Sitting, Cuff Size: Adult Large)   Pulse 96   Temp 98.7  F (37.1  C) (Oral)   Ht 1.74 m (5' 8.5\")   Wt 115.8 kg (255 lb 3.2 oz)   SpO2 95%   BMI 38.24 kg/m    EXAM:  GENERAL: healthy, alert and no distress  EYES: Eyes grossly normal to inspection, PERRL " and conjunctivae and sclerae normal  HENT: ear canals and TM's normal, nose and mouth without ulcers or lesions  NECK: no adenopathy, no asymmetry, masses, or scars and thyroid normal to palpation  RESP: lungs clear to auscultation - no rales, rhonchi or wheezes  CV: regular rate and rhythm, normal S1 S2, no S3 or S4, no murmur, click or rub, no peripheral edema and peripheral pulses strong  ABDOMEN: soft, nontender, no hepatosplenomegaly, no masses and bowel sounds normal   (male): normal male genitalia without lesions or urethral discharge, no hernia  RECTAL (male): no prostate  MS: no gross musculoskeletal defects noted, no edema  SKIN: no suspicious lesions or rashes  NEURO: Normal strength and tone, mentation intact and speech normal  PSYCH: mentation appears normal, affect normal/bright  LYMPH: no cervical, supraclavicular, axillary, or inguinal adenopathy        ASSESSMENT/PLAN:   (Z00.00) Routine general medical examination at a health care facility  (primary encounter diagnosis)  Comment: discussed preventitive healthcare   Plan: Continue to work on healthy diet and exercise, discussed healthy habits     (I10) Essential hypertension, benign  Comment: well controlled  Plan: lisinopril (PRINIVIL/ZESTRIL) 40 MG tablet        continue current medications at current doses     (M1A.00X0) Idiopathic chronic gout without tophus, unspecified site  Comment: stable  Plan: probenecid (BENEMID) 500 MG tablet        continue current medications at current doses     (E66.01) Morbid obesity (H)  Comment: working on  Plan: Continue to work on healthy diet and exercise, discussed healthy habits     (Z80.42) Family history of prostate cancer  Comment:   Plan:     (R73.03) Prediabetes  Comment:   Plan: Continue to work on healthy diet and exercise, discussed healthy habits     COUNSELING:  Reviewed preventive health counseling, as reflected in patient instructions       Regular exercise       Healthy diet/nutrition        "Vision screening       Hearing screening       Immunizations    Recommend shingrix             Colon cancer screening       Prostate cancer screening    Estimated body mass index is 38.24 kg/m  as calculated from the following:    Height as of this encounter: 1.74 m (5' 8.5\").    Weight as of this encounter: 115.8 kg (255 lb 3.2 oz).    Weight management plan: Discussed healthy diet and exercise guidelines     reports that he quit smoking about 27 years ago. He has quit using smokeless tobacco. His smokeless tobacco use included chew.      Counseling Resources:  ATP IV Guidelines  Pooled Cohorts Equation Calculator  FRAX Risk Assessment  ICSI Preventive Guidelines  Dietary Guidelines for Americans, 2010  USDA's MyPlate  ASA Prophylaxis  Lung CA Screening    Maco Galindo MD  [facility]  "

## 2019-05-30 LAB
ABBOTT PSA - QUEST: 6.4 NG/ML
URATE SERPL-MCNC: 7.4 MG/DL (ref 4–8)

## 2019-09-19 ENCOUNTER — TRANSFERRED RECORDS (OUTPATIENT)
Dept: FAMILY MEDICINE | Facility: CLINIC | Age: 69
End: 2019-09-19

## 2019-12-08 ENCOUNTER — HEALTH MAINTENANCE LETTER (OUTPATIENT)
Age: 69
End: 2019-12-08

## 2019-12-17 ENCOUNTER — OFFICE VISIT (OUTPATIENT)
Dept: FAMILY MEDICINE | Facility: CLINIC | Age: 69
End: 2019-12-17

## 2019-12-17 VITALS
SYSTOLIC BLOOD PRESSURE: 128 MMHG | DIASTOLIC BLOOD PRESSURE: 72 MMHG | WEIGHT: 255.2 LBS | TEMPERATURE: 97.4 F | HEART RATE: 60 BPM | BODY MASS INDEX: 37.8 KG/M2 | HEIGHT: 69 IN | RESPIRATION RATE: 20 BRPM

## 2019-12-17 DIAGNOSIS — R73.03 PREDIABETES: ICD-10-CM

## 2019-12-17 DIAGNOSIS — R97.20 ELEVATED PROSTATE SPECIFIC ANTIGEN (PSA): ICD-10-CM

## 2019-12-17 DIAGNOSIS — M1A.00X0 IDIOPATHIC CHRONIC GOUT WITHOUT TOPHUS, UNSPECIFIED SITE: ICD-10-CM

## 2019-12-17 DIAGNOSIS — I10 ESSENTIAL HYPERTENSION, BENIGN: Primary | ICD-10-CM

## 2019-12-17 LAB
ALBUMIN SERPL-MCNC: 4.5 G/DL (ref 3.6–5.1)
ALBUMIN/GLOB SERPL: 1.7 {RATIO} (ref 1–2.5)
ALP SERPL-CCNC: 53 U/L (ref 33–130)
ALT 1742-6: 7 U/L (ref 0–32)
AST 1920-8: 7 U/L (ref 0–35)
BILIRUB SERPL-MCNC: 1.4 MG/DL (ref 0.2–1.2)
BUN SERPL-MCNC: 19 MG/DL (ref 7–25)
BUN/CREATININE RATIO: 20.7 (ref 6–22)
CALCIUM SERPL-MCNC: 9.6 MG/DL (ref 8.6–10.3)
CHLORIDE SERPLBLD-SCNC: 106.4 MMOL/L (ref 98–110)
CO2 SERPL-SCNC: 26.9 MMOL/L (ref 20–32)
CREAT SERPL-MCNC: 0.92 MG/DL (ref 0.7–1.18)
GLOBULIN, CALCULATED - QUEST: 2.7 (ref 1.9–3.7)
GLUCOSE SERPL-MCNC: 117 MG/DL (ref 60–99)
POTASSIUM SERPL-SCNC: 4.56 MMOL/L (ref 3.5–5.3)
PROT SERPL-MCNC: 7.2 G/DL (ref 6.1–8.1)
SODIUM SERPL-SCNC: 141.2 MMOL/L (ref 135–146)

## 2019-12-17 PROCEDURE — 80053 COMPREHEN METABOLIC PANEL: CPT | Performed by: FAMILY MEDICINE

## 2019-12-17 PROCEDURE — 36415 COLL VENOUS BLD VENIPUNCTURE: CPT | Performed by: FAMILY MEDICINE

## 2019-12-17 PROCEDURE — 99213 OFFICE O/P EST LOW 20 MIN: CPT | Performed by: FAMILY MEDICINE

## 2019-12-17 RX ORDER — LISINOPRIL 40 MG/1
40 TABLET ORAL DAILY
Qty: 90 TABLET | Refills: 1 | Status: SHIPPED | OUTPATIENT
Start: 2019-12-17 | End: 2020-06-08

## 2019-12-17 ASSESSMENT — MIFFLIN-ST. JEOR: SCORE: 1910.02

## 2019-12-17 NOTE — NURSING NOTE
Maco Montes De Oca is here for fasting blood work and medication refill.  Questioned patient about current smoking habits.  Pt. quit smoking some time ago.  Body mass index is 33.67 kg/(m^2).  PULSE regular  My Chart: active    Pre-visit planning  Immunizations - up to date  Colonoscopy - is up to date  Mammogram -   Asthma -   PHQ9  YELITZA-7

## 2019-12-17 NOTE — PROGRESS NOTES
Subjective     Maco Montes De Oca is a 68 year old male who presents to clinic today for the following health issues:    HPI   Hypertension Follow-up      Do you check your blood pressure regularly outside of the clinic? Yes     Are you following a low salt diet? Yes    Are your blood pressures ever more than 140 on the top number (systolic) OR more   than 90 on the bottom number (diastolic), for example 140/90? No      How many servings of fruits and vegetables do you eat daily?  2-3    On average, how many sweetened beverages do you drink each day (Examples: soda, juice, sweet tea, etc.  Do NOT count diet or artificially sweetened beverages)?   1    How many days per week do you miss taking your medication? 0    Pt states he stopped probencid and had gout flare so wants to stay on    Patient Active Problem List   Diagnosis     Essential hypertension, benign     Vesicular palmoplantar eczema     Primary localized osteoarthrosis, other specified sites     ACP (advance care planning)     Health Care Home     Family history of prostate cancer     Dacrocystitis, left     Idiopathic chronic gout without tophus, unspecified site     Obesity (BMI 35.0-39.9) with comorbidity (H)     Prediabetes     Past Surgical History:   Procedure Laterality Date     C ANESTH,HIP JOINT SURGERY      slipped epiphysis     HC PHAKIC IOL - IMPLANT FROM SURGEON  2013    right      HC PHAKIC IOL - IMPLANT FROM SURGEON  2013    left eye        Social History     Tobacco Use     Smoking status: Former Smoker     Last attempt to quit: 1992     Years since quittin.9     Smokeless tobacco: Former User     Types: Chew   Substance Use Topics     Alcohol use: No     Frequency: Never     Family History   Problem Relation Age of Onset     Heart Disease Mother          age 75     Heart Disease Father          mi age 65     Arthritis Brother         lupus         Current Outpatient Medications   Medication Sig Dispense Refill     desonide  "(DESOWEN) 0.05 % ointment Apply topically 3 times daily as needed Apply sparingly to affected area.    Will call when needed 30 g 1     lisinopril (PRINIVIL/ZESTRIL) 40 MG tablet Take 1 tablet (40 mg) by mouth daily 90 tablet 1     probenecid (BENEMID) 500 MG tablet Take 1 tablet (500 mg) by mouth 2 times daily 180 tablet 1     Allergies   Allergen Reactions     Allopurinol      vasculitis, Dr Marin confirmed     Penicillins      Was a child-doesn't remember     Recent Labs   Lab Test 05/29/19  1541 05/29/19  1540 12/31/18  0810 07/16/18  1122 11/20/17  1132 06/19/17  1126 05/24/16  0928   LDL  --  79  --  108*  --   --  82   HDL  --  36*  --  38*  --   --  37*   TRIG  --  135  --  110  --   --  146   ALT  --   --   --  16 16 17 17   CR 0.89  --  0.83 0.89 0.91 0.96 0.95   GFRESTIMATED  --   --  90 88 88 82 84   POTASSIUM 4.56  --  4.2 4.3 4.1 4.3 4.5      BP Readings from Last 3 Encounters:   12/17/19 128/72   05/29/19 130/76   12/31/18 126/78    Wt Readings from Last 3 Encounters:   12/17/19 115.8 kg (255 lb 3.2 oz)   05/29/19 115.8 kg (255 lb 3.2 oz)   12/31/18 119.1 kg (262 lb 9.6 oz)                      Reviewed and updated as needed this visit by Provider         Review of Systems   ROS COMP: Constitutional, HEENT, cardiovascular, pulmonary, gi and gu systems are negative, except as otherwise noted.      Objective    /72 (BP Location: Right arm, Patient Position: Chair, Cuff Size: Adult Large)   Pulse 60   Temp 97.4  F (36.3  C) (Oral)   Resp 20   Ht 1.74 m (5' 8.5\")   Wt 115.8 kg (255 lb 3.2 oz)   BMI 38.24 kg/m    Body mass index is 38.24 kg/m .  Physical Exam   GENERAL: healthy, alert and no distress  EYES: Eyes grossly normal to inspection, PERRL and conjunctivae and sclerae normal  HENT: ear canals and TM's normal, nose and mouth without ulcers or lesions  NECK: no adenopathy, no asymmetry, masses, or scars and thyroid normal to palpation  RESP: lungs clear to auscultation - no rales, rhonchi " "or wheezes  CV: regular rate and rhythm, normal S1 S2, no S3 or S4, no murmur, click or rub, no peripheral edema and peripheral pulses strong  ABDOMEN: soft, nontender, no hepatosplenomegaly, no masses and bowel sounds normal  MS: no gross musculoskeletal defects noted, no edema  SKIN: no suspicious lesions or rashes  NEURO: Normal strength and tone, mentation intact and speech normal  PSYCH: mentation appears normal, affect normal/bright    Diagnostic Test Results:  Labs reviewed in Epic        Assessment & Plan   Assessment      Plan  (I10) Essential hypertension, benign  (primary encounter diagnosis)  Comment: well controlled  Plan: Comprehensive Metobolic Panel ([facility]), VENOUS         COLLECTION, lisinopril (PRINIVIL/ZESTRIL) 40 MG        tablet        continue current medications at current doses     (M1A.00X0) Idiopathic chronic gout without tophus, unspecified site  Comment: stable, had flare when stopped meds  Plan: RHEUMATOLOGY REFERRAL        continue current medications at current doses for now- he wants to see rheum at [place]    (R97.20) Elevated prostate specific antigen (PSA)  Comment: followed by urology at [place]  Plan:     (R73.03) Prediabetes  Comment: stable  Plan: Continue to work on healthy diet and exercise, discussed healthy habits     BMI:   Estimated body mass index is 38.24 kg/m  as calculated from the following:    Height as of this encounter: 1.74 m (5' 8.5\").    Weight as of this encounter: 115.8 kg (255 lb 3.2 oz).   Weight management plan: Discussed healthy diet and exercise guidelines        FUTURE APPOINTMENTS:       - Follow-up visit in 6 mo  Work on weight loss  Regular exercise    No follow-ups on file.    Maco Galindo MD  [facility]            "

## 2020-02-04 ENCOUNTER — TELEPHONE (OUTPATIENT)
Dept: FAMILY MEDICINE | Facility: CLINIC | Age: 70
End: 2020-02-04

## 2020-02-04 NOTE — TELEPHONE ENCOUNTER
"I called [facility] ( [phone] ) RE referral for Rheumatology. I was asked to fax the referral, medical records, labs and imaging to     [facility]  [phone] -- appt line  [phone] -- fax    Rheumatology will review the records to see if patient will be scheduled. After the records are received, the review process can take 5-7 business days.  If patient is  \" approved \" for an appointment [facility] will call patient to schedule.     Information has been faxed to [facility]     Patients [facility] ID#: [id number]    FYI     "

## 2020-05-08 DIAGNOSIS — M1A.00X0 IDIOPATHIC CHRONIC GOUT WITHOUT TOPHUS, UNSPECIFIED SITE: ICD-10-CM

## 2020-05-08 RX ORDER — PROBENECID 500 MG/1
TABLET, FILM COATED ORAL
Qty: 60 TABLET | Refills: 0 | Status: SHIPPED | OUTPATIENT
Start: 2020-05-08 | End: 2020-06-08

## 2020-05-08 NOTE — TELEPHONE ENCOUNTER
Maco Montes De Oca is requesting a refill of:    Pending Prescriptions:                       Disp   Refills    probenecid (BENEMID) 500 MG tablet [Pharm*60 tab*0            Sig: TAKE 1 TABLET BY MOUTH TWICE A DAY    Pt is in [place]  Pt needs OV for refills

## 2020-06-03 DIAGNOSIS — M1A.00X0 IDIOPATHIC CHRONIC GOUT WITHOUT TOPHUS, UNSPECIFIED SITE: ICD-10-CM

## 2020-06-03 RX ORDER — PROBENECID 500 MG/1
TABLET, FILM COATED ORAL
Qty: 60 TABLET | Refills: 0 | COMMUNITY
Start: 2020-06-03

## 2020-06-03 NOTE — TELEPHONE ENCOUNTER
Maco Montes De Oca is requesting a refill of:    Refused Prescriptions:                       Disp   Refills    probenecid (BENEMID) 500 MG tablet [Pharma*60 tab*0        Sig: TAKE 1 TABLET BY MOUTH TWICE A DAY  Refused By: MARY JACKSON  Reason for Refusal: Patient needs appointment  Reason for Refusal Comment: Appt scheduled for 06/08/20

## 2020-06-08 ENCOUNTER — OFFICE VISIT (OUTPATIENT)
Dept: FAMILY MEDICINE | Facility: CLINIC | Age: 70
End: 2020-06-08

## 2020-06-08 VITALS
DIASTOLIC BLOOD PRESSURE: 64 MMHG | HEART RATE: 72 BPM | RESPIRATION RATE: 20 BRPM | HEIGHT: 69 IN | WEIGHT: 255 LBS | BODY MASS INDEX: 37.77 KG/M2 | SYSTOLIC BLOOD PRESSURE: 136 MMHG | TEMPERATURE: 97.7 F

## 2020-06-08 DIAGNOSIS — R97.20 ELEVATED PROSTATE SPECIFIC ANTIGEN (PSA): ICD-10-CM

## 2020-06-08 DIAGNOSIS — B35.6 TINEA CRURIS: ICD-10-CM

## 2020-06-08 DIAGNOSIS — M1A.00X0 IDIOPATHIC CHRONIC GOUT WITHOUT TOPHUS, UNSPECIFIED SITE: ICD-10-CM

## 2020-06-08 DIAGNOSIS — I10 ESSENTIAL HYPERTENSION, BENIGN: ICD-10-CM

## 2020-06-08 DIAGNOSIS — L30.9 DERMATITIS: ICD-10-CM

## 2020-06-08 DIAGNOSIS — Z00.00 ROUTINE GENERAL MEDICAL EXAMINATION AT A HEALTH CARE FACILITY: Primary | ICD-10-CM

## 2020-06-08 LAB
ALBUMIN SERPL-MCNC: 4.7 G/DL (ref 3.6–5.1)
ALBUMIN/GLOB SERPL: 1.5 {RATIO} (ref 1–2.5)
ALP SERPL-CCNC: 61 U/L (ref 33–130)
ALT 1742-6: 14 U/L (ref 0–32)
AST 1920-8: 15 U/L (ref 0–35)
BILIRUB SERPL-MCNC: 1.3 MG/DL (ref 0.2–1.2)
BUN SERPL-MCNC: 13 MG/DL (ref 7–25)
BUN/CREATININE RATIO: 13.1 (ref 6–22)
CALCIUM SERPL-MCNC: 10.3 MG/DL (ref 8.6–10.3)
CHLORIDE SERPLBLD-SCNC: 104 MMOL/L (ref 98–110)
CHOLEST SERPL-MCNC: 155 MG/DL (ref 0–199)
CHOLEST/HDLC SERPL: 4 {RATIO} (ref 0–5)
CO2 SERPL-SCNC: 27.7 MMOL/L (ref 20–32)
CREAT SERPL-MCNC: 0.99 MG/DL (ref 0.7–1.18)
GLOBULIN, CALCULATED - QUEST: 3.1 (ref 1.9–3.7)
GLUCOSE SERPL-MCNC: 119 MG/DL (ref 60–99)
HDLC SERPL-MCNC: 43 MG/DL (ref 40–150)
LDLC SERPL CALC-MCNC: 84 MG/DL (ref 0–130)
POTASSIUM SERPL-SCNC: 4.44 MMOL/L (ref 3.5–5.3)
PROT SERPL-MCNC: 7.8 G/DL (ref 6.1–8.1)
SODIUM SERPL-SCNC: 141.5 MMOL/L (ref 135–146)
TRIGL SERPL-MCNC: 140 MG/DL (ref 0–149)

## 2020-06-08 PROCEDURE — 80061 LIPID PANEL: CPT | Performed by: FAMILY MEDICINE

## 2020-06-08 PROCEDURE — 36415 COLL VENOUS BLD VENIPUNCTURE: CPT | Performed by: FAMILY MEDICINE

## 2020-06-08 PROCEDURE — 80053 COMPREHEN METABOLIC PANEL: CPT | Performed by: FAMILY MEDICINE

## 2020-06-08 PROCEDURE — 99397 PER PM REEVAL EST PAT 65+ YR: CPT | Performed by: FAMILY MEDICINE

## 2020-06-08 RX ORDER — LISINOPRIL 40 MG/1
40 TABLET ORAL DAILY
Qty: 90 TABLET | Refills: 1 | Status: SHIPPED | OUTPATIENT
Start: 2020-06-08 | End: 2021-01-04

## 2020-06-08 RX ORDER — DESONIDE 0.5 MG/G
OINTMENT TOPICAL 3 TIMES DAILY PRN
Qty: 30 G | Refills: 1 | Status: SHIPPED | OUTPATIENT
Start: 2020-06-08 | End: 2020-06-08

## 2020-06-08 RX ORDER — PROBENECID 500 MG/1
TABLET, FILM COATED ORAL
Qty: 60 TABLET | Refills: 0 | COMMUNITY
Start: 2020-06-08 | End: 2020-07-13

## 2020-06-08 RX ORDER — DESONIDE 0.5 MG/G
OINTMENT TOPICAL 3 TIMES DAILY PRN
Qty: 30 G | Refills: 1 | Status: SHIPPED | OUTPATIENT
Start: 2020-06-08 | End: 2021-01-04

## 2020-06-08 ASSESSMENT — MIFFLIN-ST. JEOR: SCORE: 1904.11

## 2020-06-08 NOTE — NURSING NOTE
Maco Montes De Oca is here for a CPX.    Pre-visit planning  Immunizations -up to date  Colonoscopy -is up to date  Mammogram -  Asthma test --  PHQ9 -  YELITZA 7 -    Questioned patient about current smoking habits.  Pt. quit smoking some time ago.  Body mass index is 38.21 kg/m .  PULSE regular  My Chart: active  CLASSIFICATION OF OVERWEIGHT AND OBESITY BY BMI                        Obesity Class           BMI(kg/m2)  Underweight                                    < 18.5  Normal                                         18.5-24.9  Overweight                                     25.0-29.9  OBESITY                     I                  30.0-34.9                             II                 35.0-39.9  EXTREME OBESITY             III                >40                            Patient's  BMI Body mass index is 38.21 kg/m .  Http://hin.nhlbi.nih.gov/menuplanner/menu.cgi

## 2020-06-08 NOTE — PROGRESS NOTES
3  SUBJECTIVE:   CC: Maco Montes De Oca is an 69 year old male who presents for preventive health visit.     Healthy Habits:    Do you get at least three servings of calcium containing foods daily (dairy, green leafy vegetables, etc.)? yes    Amount of exercise or daily activities, outside of work: 3 day(s) per week    Problems taking medications regularly No    Medication side effects: No    Have you had an eye exam in the past two years? no    Do you see a dentist twice per year? yes    Do you have sleep apnea, excessive snoring or daytime drowsiness?no      Gout- stable, sees rheum at [facility]'s PHQ-2 Score:   PHQ-2 (  [employer]) 2019   Q1: Little interest or pleasure in doing things 0 0   Q2: Feeling down, depressed or hopeless 0 0   PHQ-2 Score 0 0       Abuse: Current or Past(Physical, Sexual or Emotional)- No  Do you feel safe in your environment? Yes        Social History     Tobacco Use     Smoking status: Former Smoker     Last attempt to quit: 1992     Years since quittin.4     Smokeless tobacco: Former User     Types: Chew   Substance Use Topics     Alcohol use: No     Frequency: Never     If you drink alcohol do you typically have >3 drinks per day or >7 drinks per week? No                      Last PSA:   Abbott PSA   Date Value Ref Range Status   2019 6.4 (H) < OR = 4.0 ng/mL Final     Comment:     The total PSA value from this assay system is   standardized against the WHO standard. The test   result will be approximately 20% lower when compared   to the equimolar-standardized total PSA (Fco   Vidya). Comparison of serial PSA results should be   interpreted with this fact in mind.     This test was performed using the Siemens   chemiluminescent method. Values obtained from   different assay methods cannot be used  interchangeably. PSA levels, regardless of  value, should not be interpreted as absolute  evidence of the presence or absence of disease.          Reviewed orders with patient. Reviewed health maintenance and updated orders accordingly - Yes  BP Readings from Last 3 Encounters:   20 136/64   19 128/72   19 130/76    Wt Readings from Last 3 Encounters:   20 115.7 kg (255 lb)   19 115.8 kg (255 lb 3.2 oz)   19 115.8 kg (255 lb 3.2 oz)                  Patient Active Problem List   Diagnosis     Essential hypertension, benign     Vesicular palmoplantar eczema     Primary localized osteoarthrosis, other specified sites     ACP (advance care planning)     Health Care Home     Family history of prostate cancer     Dacrocystitis, left     Idiopathic chronic gout without tophus, unspecified site     Obesity (BMI 35.0-39.9) with comorbidity (H)     Prediabetes     Past Surgical History:   Procedure Laterality Date     C ANESTH,HIP JOINT SURGERY      slipped epiphysis     HC PHAKIC IOL - IMPLANT FROM SURGEON  2013    right      HC PHAKIC IOL - IMPLANT FROM SURGEON  2013    left eye        Social History     Tobacco Use     Smoking status: Former Smoker     Last attempt to quit: 1992     Years since quittin.4     Smokeless tobacco: Former User     Types: Chew   Substance Use Topics     Alcohol use: No     Frequency: Never     Family History   Problem Relation Age of Onset     Heart Disease Mother          age 75     Heart Disease Father          mi age 65     Arthritis Brother         lupus         Current Outpatient Medications   Medication Sig Dispense Refill     desonide (DESOWEN) 0.05 % ointment Apply topically 3 times daily as needed Apply sparingly to affected area.    Will call when needed 30 g 1     lisinopril (PRINIVIL/ZESTRIL) 40 MG tablet Take 1 tablet (40 mg) by mouth daily 90 tablet 1     probenecid (BENEMID) 500 MG tablet TAKE 1 TABLET BY MOUTH TWICE A DAY 60 tablet 0     Allergies   Allergen Reactions     Allopurinol      vasculitis, Dr Marin confirmed     Penicillins      Was a child-doesn't  "remember     Recent Labs   Lab Test 12/17/19 05/29/19  1541 05/29/19  1540 12/31/18  0810 07/16/18  1122 11/20/17  1132 06/19/17  1126 05/24/16  0928   LDL  --   --  79  --  108*  --   --  82   HDL  --   --  36*  --  38*  --   --  37*   TRIG  --   --  135  --  110  --   --  146   ALT  --   --   --   --  16 16 17 17   CR 0.92 0.89  --  0.83 0.89 0.91 0.96 0.95   GFRESTIMATED  --   --   --  90 88 88 82 84   POTASSIUM 4.56 4.56  --  4.2 4.3 4.1 4.3 4.5        Reviewed and updated as needed this visit by clinical staff  Tobacco  Meds         Reviewed and updated as needed this visit by Provider        Past Medical History:   Diagnosis Date     Gout      Osteoarthritis       Past Surgical History:   Procedure Laterality Date     C ANESTH,HIP JOINT SURGERY      slipped epiphysis     HC PHAKIC IOL - IMPLANT FROM SURGEON  2013    right      HC PHAKIC IOL - IMPLANT FROM SURGEON  2013    left eye        ROS:  CONSTITUTIONAL: NEGATIVE for fever, chills, change in weight  INTEGUMENTARY/SKIN: NEGATIVE for worrisome rashes, moles or lesions  EYES: NEGATIVE for vision changes or irritation  ENT: NEGATIVE for ear, mouth and throat problems  RESP: NEGATIVE for significant cough or SOB  CV: NEGATIVE for chest pain, palpitations or peripheral edema  GI: NEGATIVE for nausea, abdominal pain, heartburn, or change in bowel habits   male: negative for dysuria, hematuria, decreased urinary stream, erectile dysfunction, urethral discharge  MUSCULOSKELETAL: NEGATIVE for significant arthralgias or myalgia  NEURO: NEGATIVE for weakness, dizziness or paresthesias  ENDOCRINE: NEGATIVE for temperature intolerance, skin/hair changes  HEME/ALLERGY/IMMUNE: NEGATIVE for bleeding problems  PSYCHIATRIC: NEGATIVE for changes in mood or affect    OBJECTIVE:   /64 (BP Location: Right arm, Patient Position: Chair, Cuff Size: Adult Large)   Pulse 72   Temp 97.7  F (36.5  C)   Resp 20   Ht 1.74 m (5' 8.5\")   Wt 115.7 kg (255 lb)   BMI 38.21 " kg/m    EXAM:  GENERAL: healthy, alert and no distress  EYES: Eyes grossly normal to inspection, PERRL and conjunctivae and sclerae normal  HENT: ear canals and TM's normal, nose and mouth without ulcers or lesions  NECK: no adenopathy, no asymmetry, masses, or scars and thyroid normal to palpation  RESP: lungs clear to auscultation - no rales, rhonchi or wheezes  CV: regular rate and rhythm, normal S1 S2, no S3 or S4, no murmur, click or rub, no peripheral edema and peripheral pulses strong  ABDOMEN: soft, nontender, no hepatosplenomegaly, no masses and bowel sounds normal   (male): normal male genitalia without lesions or urethral discharge, no hernia  RECTAL (male): deferred  MS: no gross musculoskeletal defects noted, no edema  SKIN: no suspicious lesions  Several seb ks, intertriginous erythema, no satellite lesions  NEURO: Normal strength and tone, mentation intact and speech normal  PSYCH: mentation appears normal, affect normal/bright  LYMPH: no cervical, supraclavicular, axillary, or inguinal adenopathy    Diagnostic Test Results:  Labs reviewed in Epic    ASSESSMENT/PLAN:   (Z00.00) Routine general medical examination at a health care facility  (primary encounter diagnosis)  Comment: discussed preventitive healthcare   Plan: Continue to work on healthy diet and exercise, discussed healthy habits     (I10) Essential hypertension, benign  Comment: well controlled  Plan: lisinopril (ZESTRIL) 40 MG tablet, Lipid Panel         (BFP), Comprehensive Metobolic Panel (BFP),         VENOUS COLLECTION        continue current medications at current doses     (L30.9) Dermatitis  Comment: stable symptomatically   Plan: desonide (DESOWEN) 0.05 % external ointment,         VENOUS COLLECTION        continue current medications at current doses      (M1A.00X0) Idiopathic chronic gout without tophus, unspecified site  Comment: stable symptomatically -seeing rheum at [place]  Plan: probenecid (BENEMID) 500 MG tablet, VENOUS     "     COLLECTION, URIC ACID ([facility])            (B35.6) Tinea cruris  Comment: discussed diagnosis- ? Early candida but we will treat tinea chen  Plan:  recommend antifungal cream, spray or powder OTC, use powder  to wick away moisture, avoid prolonged moisture exposure    Call if not better 2 weeks-could try nystatin     (R97.20) Elevated prostate specific antigen (PSA)  Comment: followed by [place]  Plan: HCL PSA, SCREENING ([facility]), VENOUS COLLECTION        Check today, he will get results to [place]      COUNSELING:  Reviewed preventive health counseling, as reflected in patient instructions       Regular exercise       Healthy diet/nutrition       Vision screening       Colon cancer screening       Prostate cancer screening    Estimated body mass index is 38.21 kg/m  as calculated from the following:    Height as of this encounter: 1.74 m (5' 8.5\").    Weight as of this encounter: 115.7 kg (255 lb).    Weight management plan: Discussed healthy diet and exercise guidelines     reports that he quit smoking about 28 years ago. He has quit using smokeless tobacco.  His smokeless tobacco use included chew.      Counseling Resources:  ATP IV Guidelines  Pooled Cohorts Equation Calculator  FRAX Risk Assessment  ICSI Preventive Guidelines  Dietary Guidelines for Americans, 2010  USDA's MyPlate  ASA Prophylaxis  Lung CA Screening    Maco Galindo MD  [facility]  "

## 2020-06-10 LAB
ABBOTT PSA - QUEST: 5.1 NG/ML
URATE SERPL-MCNC: 6.8 MG/DL (ref 4–8)

## 2020-07-13 DIAGNOSIS — M1A.00X0 IDIOPATHIC CHRONIC GOUT WITHOUT TOPHUS, UNSPECIFIED SITE: ICD-10-CM

## 2020-07-13 RX ORDER — PROBENECID 500 MG/1
TABLET, FILM COATED ORAL
Qty: 180 TABLET | Refills: 1 | Status: SHIPPED | OUTPATIENT
Start: 2020-07-13 | End: 2021-01-04

## 2020-07-13 NOTE — TELEPHONE ENCOUNTER
Maco Montes De Oca is requesting a refill of:    Pending Prescriptions:                       Disp   Refills    probenecid (BENEMID) 500 MG tablet        180 ta*1            Sig: TAKE 1 TABLET BY MOUTH TWICE A DAY    Went to [facility], are you willing to fill?    Please advise  Thanks,Mariah

## 2021-01-04 ENCOUNTER — OFFICE VISIT (OUTPATIENT)
Dept: FAMILY MEDICINE | Facility: CLINIC | Age: 71
End: 2021-01-04

## 2021-01-04 VITALS
SYSTOLIC BLOOD PRESSURE: 128 MMHG | OXYGEN SATURATION: 96 % | WEIGHT: 261.6 LBS | HEIGHT: 69 IN | BODY MASS INDEX: 38.75 KG/M2 | TEMPERATURE: 97.8 F | DIASTOLIC BLOOD PRESSURE: 72 MMHG | HEART RATE: 84 BPM

## 2021-01-04 DIAGNOSIS — R97.20 ELEVATED PROSTATE SPECIFIC ANTIGEN (PSA): ICD-10-CM

## 2021-01-04 DIAGNOSIS — R73.03 PREDIABETES: ICD-10-CM

## 2021-01-04 DIAGNOSIS — M1A.00X0 IDIOPATHIC CHRONIC GOUT WITHOUT TOPHUS, UNSPECIFIED SITE: ICD-10-CM

## 2021-01-04 DIAGNOSIS — I10 ESSENTIAL HYPERTENSION, BENIGN: Primary | ICD-10-CM

## 2021-01-04 DIAGNOSIS — L30.9 DERMATITIS: ICD-10-CM

## 2021-01-04 PROCEDURE — 99214 OFFICE O/P EST MOD 30 MIN: CPT | Performed by: FAMILY MEDICINE

## 2021-01-04 RX ORDER — LISINOPRIL 40 MG/1
40 TABLET ORAL DAILY
Qty: 90 TABLET | Refills: 1 | Status: SHIPPED | OUTPATIENT
Start: 2021-01-04 | End: 2021-06-09

## 2021-01-04 RX ORDER — PROBENECID 500 MG/1
TABLET, FILM COATED ORAL
Qty: 180 TABLET | Refills: 1 | Status: SHIPPED | OUTPATIENT
Start: 2021-01-04 | End: 2021-06-09

## 2021-01-04 RX ORDER — DESONIDE 0.5 MG/G
OINTMENT TOPICAL 2 TIMES DAILY
Qty: 30 G | Refills: 1 | Status: SHIPPED | OUTPATIENT
Start: 2021-01-04 | End: 2022-10-24

## 2021-01-04 ASSESSMENT — MIFFLIN-ST. JEOR: SCORE: 1929.05

## 2021-01-04 NOTE — PROGRESS NOTES
"  Assessment & Plan     Essential hypertension, benign  Well controlled based on numbers here and at home reviewed, no med side effects , continue current medications at current doses , Check blood pressure readings outside of the clinic several times per week, write down values, and follow up if elevated within the next several weeks. Blood pressure can be checked at the firestation, drugstore,  or any valid site.   - lisinopril (ZESTRIL) 40 MG tablet  Dispense: 90 tablet; Refill: 1    Idiopathic chronic gout without tophus, unspecified site  No flares sinece last visit so well controlled, continue current medications at current doses   - probenecid (BENEMID) 500 MG tablet  Dispense: 180 tablet; Refill: 1    Dermatitis  Pt uses rarely to control ear external dermatitis, no side effects continue current medications at current doses   - desonide (DESOWEN) 0.05 % external ointment  Dispense: 30 g; Refill: 1    Elevated prostate specific antigen (PSA)  Followed by [place] [facility] but has not been there is over a year, recheck psa here improved 6/20, will recheck again in 6 mo    Prediabetes  Weight up some, needs work, Continue to work on healthy diet and exercise, discussed healthy habits       Review of the result(s) of each unique test - PSA, previous lipid, CMP, home BPs          20 minutes spent on the date of the encounter doing chart review, history and exam, documentation and further activities as noted above         BMI:   Estimated body mass index is 39.2 kg/m  as calculated from the following:    Height as of this encounter: 1.74 m (5' 8.5\").    Weight as of this encounter: 118.7 kg (261 lb 9.6 oz).   Weight management plan: Discussed healthy diet and exercise guidelines      FUTURE APPOINTMENTS:       - Follow-up visit in 6 mo with labs  Work on weight loss  Regular exercise    No follow-ups on file.    Maco Galindo MD  [facility]    Subjective     Maco Montes De Oca is a 70 year " "old male who presents to clinic today for the following health issues :    HPI       Hypertension Follow-up      Do you check your blood pressure regularly outside of the clinic? Yes     Are you following a low salt diet? No    Are your blood pressures ever more than 140 on the top number (systolic) OR more   than 90 on the bottom number (diastolic), for example 140/90? No      How many servings of fruits and vegetables do you eat daily?  2-3    On average, how many sweetened beverages do you drink each day (Examples: soda, juice, sweet tea, etc.  Do NOT count diet or artificially sweetened beverages)?   0    How many days per week do you exercise enough to make your heart beat faster? 4    How many minutes a day do you exercise enough to make your heart beat faster? 60 or more    How many days per week do you miss taking your medication? 0        Review of Systems   Constitutional, HEENT, cardiovascular, pulmonary, gi and gu systems are negative, except as otherwise noted.      Objective    /72 (BP Location: Right arm, Patient Position: Sitting, Cuff Size: Adult Large)   Pulse 84   Temp 97.8  F (36.6  C) (Oral)   Ht 1.74 m (5' 8.5\")   Wt 118.7 kg (261 lb 9.6 oz)   SpO2 96%   BMI 39.20 kg/m    Body mass index is 39.2 kg/m .  Physical Exam   GENERAL: healthy, alert and no distress  NECK: no adenopathy, no asymmetry, masses, or scars and thyroid normal to palpation  RESP: lungs clear to auscultation - no rales, rhonchi or wheezes  CV: regular rate and rhythm, normal S1 S2, no S3 or S4, no murmur, click or rub, no peripheral edema and peripheral pulses strong  ABDOMEN: soft, nontender, no hepatosplenomegaly, no masses and bowel sounds normal  MS: no gross musculoskeletal defects noted, no edema    Office Visit on 06/08/2020   Component Date Value Ref Range Status     Cholesterol 06/08/2020 155  0 - 199 mg/dL Final     Triglycerides 06/08/2020 140  0 - 149 mg/dL Final     HDL Cholesterol 06/08/2020 43  40 - " 150 mg/dL Final     LDL Cholesterol Direct 06/08/2020 84  0 - 130 mg/dL Final     Cholesterol/HDL Ratio 06/08/2020 4  0 - 5 Final     Carbon Dioxide 06/08/2020 27.7  20 - 32 mmol/L Final     Creatinine 06/08/2020 0.99  0.70 - 1.18 mg/dL Final     Glucose 06/08/2020 119* 60 - 99 mg/dL Final     Sodium 06/08/2020 141.5  135 - 146 mmol/L Final     Potassium 06/08/2020 4.44  3.5 - 5.3 mmol/L Final     Chloride 06/08/2020 104.0  98 - 110 mmol/L Final     Protein Total 06/08/2020 7.8  6.1 - 8.1 g/dL Final     Albumin 06/08/2020 4.7  3.6 - 5.1 g/dL Final     Alkaline Phosphatase 06/08/2020 61  33 - 130 U/L Final     ALT 06/08/2020 14  0 - 32 U/L Final     AST 06/08/2020 15  0 - 35 U/L Final     Bilirubin Total 06/08/2020 1.3* 0.2 - 1.2 mg/dL Final     Urea Nitrogen 06/08/2020 13  7 - 25 mg/dL Final     Calcium 06/08/2020 10.3  8.6 - 10.3 mg/dL Final     BUN/Creatinine Ratio 06/08/2020 13.1  6 - 22 Final     Globulin Calculated 06/08/2020 3.1  1.9 - 3.7 Final     A/G Ratio 06/08/2020 1.5  1 - 2.5 Final     Abbott PSA 06/08/2020 5.1* < OR = 4.0 ng/mL Final    Comment: The total PSA value from this assay system is   standardized against the WHO standard. The test   result will be approximately 20% lower when compared   to the equimolar-standardized total PSA (Fco   Vidya). Comparison of serial PSA results should be   interpreted with this fact in mind.     This test was performed using the Siemens   chemiluminescent method. Values obtained from   different assay methods cannot be used  interchangeably. PSA levels, regardless of  value, should not be interpreted as absolute  evidence of the presence or absence of disease.       Uric Acid 06/08/2020 6.8  4.0 - 8.0 mg/dL Final    Comment: Therapeutic target for gout patients: <6.0 mg/dL

## 2021-01-04 NOTE — NURSING NOTE
Alexandre is here today for a med recheck.    Pre-visit Screening:  Immunizations:  up to date  Colonoscopy:  is up to date  Mammogram: NA  Asthma Action Test/Plan:  AMANDA  PHQ9:  NA  GAD7:  NA  Questioned patient about current smoking habits Pt. quit smoking some time ago.  Ok to leave detailed message on voice mail for today's visit only Yes, phone # [phone]      
Vaginal Delivery

## 2021-01-07 DIAGNOSIS — I10 ESSENTIAL HYPERTENSION, BENIGN: ICD-10-CM

## 2021-01-07 DIAGNOSIS — M1A.00X0 IDIOPATHIC CHRONIC GOUT WITHOUT TOPHUS, UNSPECIFIED SITE: ICD-10-CM

## 2021-01-07 RX ORDER — LISINOPRIL 40 MG/1
TABLET ORAL
Qty: 90 TABLET | Refills: 1 | COMMUNITY
Start: 2021-01-07

## 2021-01-07 RX ORDER — PROBENECID 500 MG/1
TABLET, FILM COATED ORAL
Qty: 180 TABLET | Refills: 1 | COMMUNITY
Start: 2021-01-07

## 2021-01-07 NOTE — TELEPHONE ENCOUNTER
Maco Montes De Oca is requesting a refill of:    Refused Prescriptions:                       Disp   Refills    lisinopril (ZESTRIL) 40 MG tablet [Pharmac*90 tab*1        Sig: TAKE 1 TABLET BY MOUTH EVERY DAY  Refused By: MARY JACKSON  Reason for Refusal: Should already have refills on file    probenecid (BENEMID) 500 MG tablet [Pharma*180 ta*1        Sig: TAKE 1 TABLET BY MOUTH TWICE A DAY  Refused By: MARY JACKSON  Reason for Refusal: Should already have refills on file    Refilled on 01/04/21 for 6 months. Pt took written script.

## 2021-06-09 ENCOUNTER — OFFICE VISIT (OUTPATIENT)
Dept: FAMILY MEDICINE | Facility: CLINIC | Age: 71
End: 2021-06-09

## 2021-06-09 VITALS
OXYGEN SATURATION: 97 % | HEART RATE: 57 BPM | SYSTOLIC BLOOD PRESSURE: 142 MMHG | DIASTOLIC BLOOD PRESSURE: 72 MMHG | HEIGHT: 69 IN | WEIGHT: 264 LBS | BODY MASS INDEX: 39.1 KG/M2 | TEMPERATURE: 97.8 F | RESPIRATION RATE: 20 BRPM

## 2021-06-09 DIAGNOSIS — Z00.00 ROUTINE GENERAL MEDICAL EXAMINATION AT A HEALTH CARE FACILITY: Primary | ICD-10-CM

## 2021-06-09 DIAGNOSIS — I10 ESSENTIAL HYPERTENSION, BENIGN: ICD-10-CM

## 2021-06-09 DIAGNOSIS — M1A.00X0 IDIOPATHIC CHRONIC GOUT WITHOUT TOPHUS, UNSPECIFIED SITE: ICD-10-CM

## 2021-06-09 DIAGNOSIS — R97.20 ELEVATED PROSTATE SPECIFIC ANTIGEN (PSA): ICD-10-CM

## 2021-06-09 PROCEDURE — 80053 COMPREHEN METABOLIC PANEL: CPT | Performed by: FAMILY MEDICINE

## 2021-06-09 PROCEDURE — 99397 PER PM REEVAL EST PAT 65+ YR: CPT | Performed by: FAMILY MEDICINE

## 2021-06-09 PROCEDURE — 80061 LIPID PANEL: CPT | Performed by: FAMILY MEDICINE

## 2021-06-09 PROCEDURE — 36415 COLL VENOUS BLD VENIPUNCTURE: CPT | Performed by: FAMILY MEDICINE

## 2021-06-09 RX ORDER — LISINOPRIL 40 MG/1
40 TABLET ORAL DAILY
Qty: 90 TABLET | Refills: 1 | Status: SHIPPED | OUTPATIENT
Start: 2021-06-09 | End: 2021-06-28

## 2021-06-09 RX ORDER — PROBENECID 500 MG/1
TABLET, FILM COATED ORAL
Qty: 180 TABLET | Refills: 1 | Status: SHIPPED | OUTPATIENT
Start: 2021-06-09 | End: 2021-06-28

## 2021-06-09 ASSESSMENT — MIFFLIN-ST. JEOR: SCORE: 1939.94

## 2021-06-09 NOTE — NURSING NOTE
Chief Complaint   Patient presents with     Physical     annual, fasting      Pre-visit Screening:  Immunizations:  up to date but needs to get second shingles shot   Colonoscopy:  is up to date  Mammogram: NA  Asthma Action Test/Plan:  NA  PHQ9:  PHQ-2 done today   GAD7:  No concerns  Questioned patient about current smoking habits Pt. quit smoking some time ago.  Ok to leave detailed message on voice mail for today's visit only Yes, phone # [phone]

## 2021-06-09 NOTE — PROGRESS NOTES
3  SUBJECTIVE:   CC: Maco Montes De Oca is an 70 year old male who presents for preventive health visit.       Patient has been advised of split billing requirements and indicates understanding: Yes  Healthy Habits:    Do you get at least three servings of calcium containing foods daily (dairy, green leafy vegetables, etc.)? yes    Amount of exercise or daily activities, outside of work: 6 day(s) per week walking    Problems taking medications regularly No    Medication side effects: No    Have you had an eye exam in the past two years? yes    Do you see a dentist twice per year? no    Do you have sleep apnea, excessive snoring or daytime drowsiness?no-states he sleeps only 4 hours at a time- does take long nap in day-never evaluated for ANTHONY      Pt followed at [facility] for PSA-last MRI  OK, never had bx    Today's PHQ-2 Score:   PHQ-2 (  [employer]) 2021   Q1: Little interest or pleasure in doing things 0 0   Q2: Feeling down, depressed or hopeless 0 0   PHQ-2 Score 0 0       Abuse: Current or Past(Physical, Sexual or Emotional)- No  Do you feel safe in your environment? Yes        Social History     Tobacco Use     Smoking status: Former Smoker     Quit date: 1992     Years since quittin.4     Smokeless tobacco: Former User     Types: Chew   Substance Use Topics     Alcohol use: No     Frequency: Never     If you drink alcohol do you typically have >3 drinks per day or >7 drinks per week? No                      Last PSA:   Abbott PSA   Date Value Ref Range Status   2020 5.1 (H) < OR = 4.0 ng/mL Final     Comment:     The total PSA value from this assay system is   standardized against the WHO standard. The test   result will be approximately 20% lower when compared   to the equimolar-standardized total PSA (Fco   [place]). Comparison of serial PSA results should be   interpreted with this fact in mind.     This test was performed using the Siemens   chemiluminescent method. Values  obtained from   different assay methods cannot be used  interchangeably. PSA levels, regardless of  value, should not be interpreted as absolute  evidence of the presence or absence of disease.         Reviewed orders with patient. Reviewed health maintenance and updated orders accordingly - Yes  BP Readings from Last 3 Encounters:   21 (!) 142/72   21 128/72   20 136/64    Wt Readings from Last 3 Encounters:   21 119.7 kg (264 lb)   21 118.7 kg (261 lb 9.6 oz)   20 115.7 kg (255 lb)                  Patient Active Problem List   Diagnosis     Essential hypertension, benign     Vesicular palmoplantar eczema     Primary localized osteoarthrosis, other specified sites     ACP (advance care planning)     Health Care Home     Family history of prostate cancer     Dacrocystitis, left     Idiopathic chronic gout without tophus, unspecified site     Obesity (BMI 35.0-39.9) with comorbidity (H)     Prediabetes     Past Surgical History:   Procedure Laterality Date     C ANESTH,HIP JOINT SURGERY      slipped epiphysis     HC PHAKIC IOL - IMPLANT FROM SURGEON  2013    right      HC PHAKIC IOL - IMPLANT FROM SURGEON  2013    left eye        Social History     Tobacco Use     Smoking status: Former Smoker     Quit date: 1992     Years since quittin.4     Smokeless tobacco: Former User     Types: Chew   Substance Use Topics     Alcohol use: No     Frequency: Never     Family History   Problem Relation Age of Onset     Heart Disease Mother          age 75     Heart Disease Father          mi age 65     Arthritis Brother         lupus         Current Outpatient Medications   Medication Sig Dispense Refill     lisinopril (ZESTRIL) 40 MG tablet Take 1 tablet (40 mg) by mouth daily 90 tablet 1     probenecid (BENEMID) 500 MG tablet TAKE 1 TABLET BY MOUTH TWICE A  tablet 1     desonide (DESOWEN) 0.05 % external ointment Apply topically 2 times daily (Patient not taking:  Reported on 6/9/2021) 30 g 1     Allergies   Allergen Reactions     Allopurinol      vasculitis, Dr Marin confirmed     Penicillins      Was a child-doesn't remember     Recent Labs   Lab Test 06/08/20 12/17/19 05/29/19  1540 05/29/19  1540 12/31/18  0810 07/16/18  1122 11/20/17  1132 06/19/17  1126   LDL 84  --   --  79  --  108*  --   --    HDL 43  --   --  36*  --  38*  --   --    TRIG 140  --   --  135  --  110  --   --    ALT  --   --   --   --   --  16 16 17   CR 0.99 0.92   < >  --  0.83 0.89 0.91 0.96   GFRESTIMATED  --   --   --   --  90 88 88 82   POTASSIUM 4.44 4.56   < >  --  4.2 4.3 4.1 4.3    < > = values in this interval not displayed.        Reviewed and updated as needed this visit by clinical staff  Tobacco  Allergies  Meds              Reviewed and updated as needed this visit by Provider                Past Medical History:   Diagnosis Date     Gout      Osteoarthritis       Past Surgical History:   Procedure Laterality Date     C ANESTH,HIP JOINT SURGERY      slipped epiphysis     HC PHAKIC IOL - IMPLANT FROM SURGEON  2013    right      HC PHAKIC IOL - IMPLANT FROM SURGEON  2013    left eye        ROS:  CONSTITUTIONAL: NEGATIVE for fever, chills, change in weight  INTEGUMENTARY/SKIN: NEGATIVE for worrisome rashes, moles or lesions  EYES: NEGATIVE for vision changes or irritation  ENT: NEGATIVE for ear, mouth and throat problems  RESP: NEGATIVE for significant cough or SOB  CV: NEGATIVE for chest pain, palpitations or peripheral edema  GI: NEGATIVE for nausea, abdominal pain, heartburn, or change in bowel habits   male: negative for dysuria, hematuria, decreased urinary stream, erectile dysfunction, urethral discharge  MUSCULOSKELETAL: NEGATIVE for significant arthralgias or myalgia  NEURO: NEGATIVE for weakness, dizziness or paresthesias  ENDOCRINE: NEGATIVE for temperature intolerance, skin/hair changes  HEME/ALLERGY/IMMUNE: NEGATIVE for bleeding problems  PSYCHIATRIC: NEGATIVE for changes  "in mood or affect    OBJECTIVE:   BP (!) 142/72 (BP Location: Left arm, Patient Position: Sitting, Cuff Size: Adult Large)   Pulse 57   Temp 97.8  F (36.6  C) (Temporal)   Resp 20   Ht 1.74 m (5' 8.5\")   Wt 119.7 kg (264 lb)   SpO2 97%   BMI 39.56 kg/m    EXAM:  GENERAL: healthy, alert and no distress  EYES: Eyes grossly normal to inspection, PERRL and conjunctivae and sclerae normal  HENT: ear canals and TM's normal, nose and mouth without ulcers or lesions  NECK: no adenopathy, no asymmetry, masses, or scars and thyroid normal to palpation  RESP: lungs clear to auscultation - no rales, rhonchi or wheezes  CV: regular rate and rhythm, normal S1 S2, no S3 or S4, no murmur, click or rub, no peripheral edema and peripheral pulses strong  ABDOMEN: soft, nontender, no hepatosplenomegaly, no masses and bowel sounds normal   (male): normal male genitalia without lesions or urethral discharge, no hernia  RECTAL (male): deferred  MS: no gross musculoskeletal defects noted, no edema  SKIN: no suspicious lesions or rashes  NEURO: Normal strength and tone, mentation intact and speech normal  PSYCH: mentation appears normal, affect normal/bright    Diagnostic Test Results:  Labs reviewed in Epic    ASSESSMENT/PLAN:   (Z00.00) Routine general medical examination at a health care facility  (primary encounter diagnosis)  Comment: discussed preventitive healthcare   Plan: Continue to work on healthy diet and exercise, discussed healthy habits     I did raise concerns he may have ANTHONY- offer sleep referral but he declines today    (I10) Essential hypertension, benign  Comment: borderline today  Plan: lisinopril (ZESTRIL) 40 MG tablet, Lipid Panel         ([facility]), Comprehensive Metobolic Panel ([facility]),         VENOUS COLLECTION        continue current medications at current doses Check blood pressure readings outside of the clinic several times per week, write down values, and follow up if elevated within the next several weeks. " "Blood pressure can be checked at the firestation, drugstore,  or any valid site.     (M1A.00X0) Idiopathic chronic gout without tophus, unspecified site  Comment: well controlled,continue current medications at current doses   Plan: probenecid (BENEMID) 500 MG tablet, URIC ACID         ([facility])            (R97.20) Elevated prostate specific antigen (PSA)  Comment: recheck today, he will relay results to [facility]  Plan: PSA Total ([facility]), VENOUS COLLECTION              Patient has been advised of split billing requirements and indicates understanding: Yes  COUNSELING:  Reviewed preventive health counseling, as reflected in patient instructions       Regular exercise       Healthy diet/nutrition       Vision screening       Immunizations    Pt will get second shingrix in a month at              Colon cancer screening       Prostate cancer screening    Estimated body mass index is 39.56 kg/m  as calculated from the following:    Height as of this encounter: 1.74 m (5' 8.5\").    Weight as of this encounter: 119.7 kg (264 lb).    Weight management plan: Discussed healthy diet and exercise guidelines    He reports that he quit smoking about 29 years ago. He has quit using smokeless tobacco.  His smokeless tobacco use included chew.      Counseling Resources:  ATP IV Guidelines  Pooled Cohorts Equation Calculator  FRAX Risk Assessment  ICSI Preventive Guidelines  Dietary Guidelines for Americans, 2010  USDA's MyPlate  ASA Prophylaxis  Lung CA Screening    Maco Galindo MD  [facility]  "

## 2021-06-10 LAB
ABBOTT PSA - QUEST: 4.8 NG/ML
ALBUMIN SERPL-MCNC: 4.4 G/DL (ref 3.6–5.1)
ALBUMIN/GLOB SERPL: 2 {RATIO} (ref 1–2.5)
ALP SERPL-CCNC: 63 U/L (ref 33–130)
ALT 1742-6: 15 U/L (ref 0–32)
AST 1920-8: 15 U/L (ref 0–35)
BILIRUB SERPL-MCNC: 1 MG/DL (ref 0.2–1.2)
BUN SERPL-MCNC: 15 MG/DL (ref 7–25)
BUN/CREATININE RATIO: 14 (ref 6–22)
CALCIUM SERPL-MCNC: 9.8 MG/DL (ref 8.6–10.3)
CHLORIDE SERPLBLD-SCNC: 109.1 MMOL/L (ref 98–110)
CHOLEST SERPL-MCNC: 136 MG/DL (ref 0–199)
CHOLEST/HDLC SERPL: 3 {RATIO} (ref 0–5)
CO2 SERPL-SCNC: 28.4 MMOL/L (ref 20–32)
CREAT SERPL-MCNC: 1.08 MG/DL (ref 0.6–1.3)
GLOBULIN, CALCULATED - QUEST: 3 (ref 1.9–3.7)
GLUCOSE SERPL-MCNC: 115 MG/DL (ref 60–99)
HDLC SERPL-MCNC: 45 MG/DL (ref 40–150)
LDLC SERPL CALC-MCNC: 74 MG/DL (ref 0–130)
POTASSIUM SERPL-SCNC: 5.81 MMOL/L (ref 3.5–5.3)
PROT SERPL-MCNC: 7.2 G/DL (ref 6.1–8.1)
SODIUM SERPL-SCNC: 146.1 MMOL/L (ref 135–146)
TRIGL SERPL-MCNC: 85 MG/DL (ref 0–149)
URATE SERPL-MCNC: 6.5 MG/DL (ref 4–8)

## 2021-06-27 DIAGNOSIS — M1A.00X0 IDIOPATHIC CHRONIC GOUT WITHOUT TOPHUS, UNSPECIFIED SITE: ICD-10-CM

## 2021-06-27 DIAGNOSIS — I10 ESSENTIAL HYPERTENSION, BENIGN: ICD-10-CM

## 2021-06-28 RX ORDER — LISINOPRIL 40 MG/1
TABLET ORAL
Qty: 90 TABLET | Refills: 1 | Status: SHIPPED | OUTPATIENT
Start: 2021-06-28 | End: 2021-11-29

## 2021-06-28 RX ORDER — PROBENECID 500 MG/1
TABLET, FILM COATED ORAL
Qty: 180 TABLET | Refills: 1 | Status: SHIPPED | OUTPATIENT
Start: 2021-06-28 | End: 2021-11-29

## 2021-06-28 NOTE — TELEPHONE ENCOUNTER
Maco Montes De Oca is requesting a refill of:    Pending Prescriptions:                       Disp   Refills    probenecid (BENEMID) 500 MG tablet [Pharm*180 ta*1            Sig: TAKE 1 TABLET BY MOUTH TWICE A DAY    lisinopril (ZESTRIL) 40 MG tablet [Pharma*90 tab*1            Sig: TAKE 1 TABLET BY MOUTH EVERY DAY    Please close encounter if RX was sent. Thanks, Mariah

## 2021-10-23 ENCOUNTER — HEALTH MAINTENANCE LETTER (OUTPATIENT)
Age: 71
End: 2021-10-23

## 2021-11-29 ENCOUNTER — OFFICE VISIT (OUTPATIENT)
Dept: FAMILY MEDICINE | Facility: CLINIC | Age: 71
End: 2021-11-29

## 2021-11-29 VITALS
BODY MASS INDEX: 39.01 KG/M2 | HEART RATE: 76 BPM | DIASTOLIC BLOOD PRESSURE: 76 MMHG | SYSTOLIC BLOOD PRESSURE: 144 MMHG | TEMPERATURE: 97.3 F | RESPIRATION RATE: 20 BRPM | WEIGHT: 263.4 LBS | HEIGHT: 69 IN

## 2021-11-29 DIAGNOSIS — M72.0 DUPUYTREN'S DISEASE OF PALM OF LEFT HAND: ICD-10-CM

## 2021-11-29 DIAGNOSIS — M1A.00X0 IDIOPATHIC CHRONIC GOUT WITHOUT TOPHUS, UNSPECIFIED SITE: ICD-10-CM

## 2021-11-29 DIAGNOSIS — R97.20 ELEVATED PROSTATE SPECIFIC ANTIGEN (PSA): ICD-10-CM

## 2021-11-29 DIAGNOSIS — I10 ESSENTIAL HYPERTENSION, BENIGN: Primary | ICD-10-CM

## 2021-11-29 DIAGNOSIS — R73.03 PREDIABETES: ICD-10-CM

## 2021-11-29 PROCEDURE — 99213 OFFICE O/P EST LOW 20 MIN: CPT | Performed by: FAMILY MEDICINE

## 2021-11-29 RX ORDER — PROBENECID 500 MG/1
500 TABLET, FILM COATED ORAL 2 TIMES DAILY
Qty: 180 TABLET | Refills: 1 | Status: SHIPPED | OUTPATIENT
Start: 2021-11-29 | End: 2022-06-16

## 2021-11-29 RX ORDER — LISINOPRIL 40 MG/1
40 TABLET ORAL DAILY
Qty: 90 TABLET | Refills: 1 | Status: SHIPPED | OUTPATIENT
Start: 2021-11-29 | End: 2022-06-16

## 2021-11-29 ASSESSMENT — MIFFLIN-ST. JEOR: SCORE: 1937.21

## 2021-11-29 NOTE — PROGRESS NOTES
"  Assessment & Plan     Essential hypertension, benign  suboptimal control, needs to check at home readings, Check blood pressure readings outside of the clinic several times per week, write down values, and follow up if elevated within the next several weeks. Blood pressure can be checked at the firestation, drugstore,  or any valid site.     Add hydrochlorothiazide if high    - lisinopril (ZESTRIL) 40 MG tablet  Dispense: 90 tablet; Refill: 1    Idiopathic chronic gout without tophus, unspecified site  No flares, continue current medications at current doses   - probenecid (BENEMID) 500 MG tablet  Dispense: 180 tablet; Refill: 1    Prediabetes  Continue to work on healthy diet and exercise, discussed healthy habits     Dupuytren's disease of palm of left hand  No symptoms     Elevated prostate specific antigen (PSA)  stable      Review of the result(s) of each unique test - labs  Prescription drug management         BMI:   Estimated body mass index is 39.47 kg/m  as calculated from the following:    Height as of this encounter: 1.74 m (5' 8.5\").    Weight as of this encounter: 119.5 kg (263 lb 6.4 oz).   Weight management plan: Discussed healthy diet and exercise guidelines    FUTURE APPOINTMENTS:       - Follow-up visit in 6 mo fasting  Work on weight loss  Regular exercise    No follow-ups on file.    Maco Galindo MD  [facility]    Subjective   Alexandre is a 70 year old who presents for the following health issues     HPI     Hypertension Follow-up      Do you check your blood pressure regularly outside of the clinic? No     Are you following a low salt diet? No    Are your blood pressures ever more than 140 on the top number (systolic) OR more   than 90 on the bottom number (diastolic), for example 140/90? unsure    Gout- no flares    How many servings of fruits and vegetables do you eat daily?  2-3    On average, how many sweetened beverages do you drink each day (Examples: soda, " "juice, sweet tea, etc.  Do NOT count diet or artificially sweetened beverages)?   2    How many days per week do you exercise enough to make your heart beat faster? 5    How many minutes a day do you exercise enough to make your heart beat faster? 30 - 60    How many days per week do you miss taking your medication? 0        Review of Systems   Constitutional, HEENT, cardiovascular, pulmonary, gi and gu systems are negative, except as otherwise noted.      Objective    BP (!) 144/76 (BP Location: Left arm, Patient Position: Chair, Cuff Size: Adult Large)   Pulse 76   Temp 97.3  F (36.3  C)   Resp 20   Ht 1.74 m (5' 8.5\")   Wt 119.5 kg (263 lb 6.4 oz)   BMI 39.47 kg/m    Body mass index is 39.47 kg/m .  Physical Exam   GENERAL: healthy, alert and no distress  NECK: no adenopathy, no asymmetry, masses, or scars and thyroid normal to palpation  RESP: lungs clear to auscultation - no rales, rhonchi or wheezes  CV: regular rate and rhythm, normal S1 S2, no S3 or S4, no murmur, click or rub, no peripheral edema and peripheral pulses strong  ABDOMEN: soft, nontender, no hepatosplenomegaly, no masses and bowel sounds normal  MS: no gross musculoskeletal defects noted, no edema    Office Visit on 06/09/2021   Component Date Value Ref Range Status     Cholesterol 06/09/2021 136  0 - 199 mg/dL Final     Triglycerides 06/09/2021 85  0 - 149 mg/dL Final     HDL Cholesterol 06/09/2021 45  40 - 150 mg/dL Final     LDL Cholesterol Direct 06/09/2021 74  0 - 130 mg/dL Final     Cholesterol/HDL Ratio 06/09/2021 3  0 - 5 Final     Carbon Dioxide 06/09/2021 28.4  20 - 32 mmol/L Final     Creatinine 06/09/2021 1.08  0.60 - 1.30 mg/dL Final     Glucose 06/09/2021 115* 60 - 99 mg/dL Final     Sodium 06/09/2021 146.1* 135 - 146 mmol/L Corrected    ran twice     Potassium 06/09/2021 5.81* 3.5 - 5.3 mmol/L Corrected    ran twice     Chloride 06/09/2021 109.1  98 - 110 mmol/L Final     Protein Total 06/09/2021 7.2  6.1 - 8.1 g/dL Final "     Albumin 06/09/2021 4.4  3.6 - 5.1 g/dL Final     Alkaline Phosphatase 06/09/2021 63  33 - 130 U/L Final     ALT 06/09/2021 15  0 - 32 U/L Final     AST 06/09/2021 15  0 - 35 U/L Final     Bilirubin Total 06/09/2021 1.0  0.2 - 1.2 mg/dL Final     Urea Nitrogen 06/09/2021 15  7 - 25 mg/dL Final     Calcium 06/09/2021 9.8  8.6 - 10.3 mg/dL Final     BUN/Creatinine Ratio 06/09/2021 14  6 - 22 Final     Globulin Calculated 06/09/2021 3  1.9 - 3.7 Final     A/G Ratio 06/09/2021 2  1 - 2.5 Final     Abbott PSA 06/09/2021 4.8* < OR = 4.0 ng/mL Final    Comment: The total PSA value from this assay system is   standardized against the WHO standard. The test   result will be approximately 20% lower when compared   to the equimolar-standardized total PSA ([facility]   [place]). Comparison of serial PSA results should be   interpreted with this fact in mind.     This test was performed using the Siemens   chemiluminescent method. Values obtained from   different assay methods cannot be used  interchangeably. PSA levels, regardless of  value, should not be interpreted as absolute  evidence of the presence or absence of disease.       Uric Acid 06/09/2021 6.5  4.0 - 8.0 mg/dL Final    Comment: Therapeutic target for gout patients: <6.0 mg/dL

## 2021-11-29 NOTE — NURSING NOTE
Maco Montes De Oca is here for fasting blood work and medication refill.  Questioned patient about current smoking habits.  Pt. quit smoking some time ago.  Body mass index is 33.67 kg/(m^2).  PULSE regular  My Chart: active    Pre-visit planning  Immunizations - up to date  Colonoscopy - is up to date  Mammogram -   Asthma -   PHQ9  YELITZA-7  Hearing screen -

## 2022-05-16 NOTE — TELEPHONE ENCOUNTER
Refused Prescriptions:                       Disp   Refills    lisinopril (PRINIVIL/ZESTRIL) 40 MG tablet*90 tab*1        Sig: TAKE 1 TABLET (40 MG) BY MOUTH DAILY  Refused By: JIN CHIU  Reason for Refusal: Request already responded to by other means (phone, fax, etc.)  Reason for Refusal Comment: refilled 11- for six months    probenecid (BENEMID) 500 MG tablet [Pharma*180 ta*1        Sig: TAKE 1 TABLET BY MOUTH TWICE A DAY  Refused By: JIN CHIU  Reason for Refusal: Request already responded to by other means (phone, fax, etc.)  Reason for Refusal Comment: refilled 11- for six months    [facility]  [phone] ([place])      · Refill requested by: Pharmacy Interface  · Last office visit for this medication: 4/29/22  · Next office visit: 5/20/22  · Medication(s) Requested:   diclofenac (VOLTAREN) 75 MG EC tablet 180 tablet 1 12/13/2021     Sig - Route: Take 1 tablet by mouth 2 times daily. - Oral    Sent to pharmacy as: Diclofenac Sodium 75 MG Oral Tablet Delayed Release (VOLTAREN)      pantoprazole (PROTONIX) 20 MG tablet 90 tablet 1 12/13/2021     Sig - Route: Take 1 tablet by mouth daily. - Oral    Sent to pharmacy as: Pantoprazole Sodium 20 MG Oral Tablet Delayed Release (PROTONIX)    Class: Eprescribe        ·   · Quantity requested: 90  Refer to paper form refill protocol  Can not refill without MD authorization

## 2022-06-16 ENCOUNTER — OFFICE VISIT (OUTPATIENT)
Dept: FAMILY MEDICINE | Facility: CLINIC | Age: 72
End: 2022-06-16

## 2022-06-16 VITALS
TEMPERATURE: 97.2 F | RESPIRATION RATE: 20 BRPM | BODY MASS INDEX: 38.45 KG/M2 | HEIGHT: 69 IN | SYSTOLIC BLOOD PRESSURE: 134 MMHG | HEART RATE: 72 BPM | DIASTOLIC BLOOD PRESSURE: 70 MMHG | WEIGHT: 259.6 LBS

## 2022-06-16 DIAGNOSIS — Z80.42 FH: PROSTATE CANCER: ICD-10-CM

## 2022-06-16 DIAGNOSIS — M1A.00X0 IDIOPATHIC CHRONIC GOUT WITHOUT TOPHUS, UNSPECIFIED SITE: ICD-10-CM

## 2022-06-16 DIAGNOSIS — I10 ESSENTIAL HYPERTENSION, BENIGN: ICD-10-CM

## 2022-06-16 DIAGNOSIS — Z00.00 ROUTINE GENERAL MEDICAL EXAMINATION AT A HEALTH CARE FACILITY: Primary | ICD-10-CM

## 2022-06-16 DIAGNOSIS — G47.00 INSOMNIA, UNSPECIFIED TYPE: ICD-10-CM

## 2022-06-16 DIAGNOSIS — R73.03 PREDIABETES: ICD-10-CM

## 2022-06-16 LAB
ALBUMIN SERPL-MCNC: 4.4 G/DL (ref 3.6–5.1)
ALBUMIN/GLOB SERPL: 1.6 {RATIO} (ref 1–2.5)
ALP SERPL-CCNC: 62 U/L (ref 33–130)
ALT 1742-6: 21 U/L (ref 0–32)
AST 1920-8: 18 U/L (ref 0–35)
BILIRUB SERPL-MCNC: 1.1 MG/DL (ref 0.2–1.2)
BUN SERPL-MCNC: 20 MG/DL (ref 7–25)
BUN/CREATININE RATIO: 21.3 (ref 6–22)
CALCIUM SERPL-MCNC: 9.7 MG/DL (ref 8.6–10.3)
CHLORIDE SERPLBLD-SCNC: 106.4 MMOL/L (ref 98–110)
CHOLEST SERPL-MCNC: 143 MG/DL (ref 0–199)
CHOLEST/HDLC SERPL: 4 {RATIO} (ref 0–5)
CO2 SERPL-SCNC: 22 MMOL/L (ref 20–32)
CREAT SERPL-MCNC: 0.94 MG/DL (ref 0.6–1.3)
GLOBULIN, CALCULATED - QUEST: 2.8 (ref 1.9–3.7)
GLUCOSE SERPL-MCNC: 110 MG/DL (ref 60–99)
HDLC SERPL-MCNC: 36 MG/DL (ref 40–150)
LDLC SERPL CALC-MCNC: 82 MG/DL (ref 0–130)
POTASSIUM SERPL-SCNC: 4.47 MMOL/L (ref 3.5–5.3)
PROT SERPL-MCNC: 7.2 G/DL (ref 6.1–8.1)
SODIUM SERPL-SCNC: 140.2 MMOL/L (ref 135–146)
TRIGL SERPL-MCNC: 126 MG/DL (ref 0–149)

## 2022-06-16 PROCEDURE — 99397 PER PM REEVAL EST PAT 65+ YR: CPT | Performed by: FAMILY MEDICINE

## 2022-06-16 PROCEDURE — 80053 COMPREHEN METABOLIC PANEL: CPT | Performed by: FAMILY MEDICINE

## 2022-06-16 PROCEDURE — 80061 LIPID PANEL: CPT | Performed by: FAMILY MEDICINE

## 2022-06-16 PROCEDURE — 36415 COLL VENOUS BLD VENIPUNCTURE: CPT | Performed by: FAMILY MEDICINE

## 2022-06-16 RX ORDER — TRAZODONE HYDROCHLORIDE 50 MG/1
50 TABLET, FILM COATED ORAL AT BEDTIME
Qty: 30 TABLET | Refills: 1 | Status: SHIPPED | OUTPATIENT
Start: 2022-06-16 | End: 2022-10-24

## 2022-06-16 RX ORDER — PROBENECID 500 MG/1
500 TABLET, FILM COATED ORAL 2 TIMES DAILY
Qty: 180 TABLET | Refills: 1 | Status: SHIPPED | OUTPATIENT
Start: 2022-06-16 | End: 2022-10-24

## 2022-06-16 RX ORDER — LISINOPRIL 40 MG/1
40 TABLET ORAL DAILY
Qty: 90 TABLET | Refills: 1 | Status: SHIPPED | OUTPATIENT
Start: 2022-06-16 | End: 2022-10-24

## 2022-06-16 NOTE — PROGRESS NOTES
3  SUBJECTIVE:   CC: Maco Montes De Oca is an 71 year old male who presents for preventive health visit.       Patient has been advised of split billing requirements and indicates understanding: Yes  Healthy Habits:    Do you get at least three servings of calcium containing foods daily (dairy, green leafy vegetables, etc.)? yes    Amount of exercise or daily activities, outside of work: 5 day(s) per week    Problems taking medications regularly No    Medication side effects: No    Have you had an eye exam in the past two years? yes    Do you see a dentist twice per year? yes    Do you have sleep apnea, excessive snoring or daytime drowsiness?no, does have trouble getting to sleep, melatonin not working          Today's PHQ-2 Score:   PHQ-2 (  [employer]) 2021   Q1: Little interest or pleasure in doing things 0 0   Q2: Feeling down, depressed or hopeless 0 0   PHQ-2 Score 0 0   PHQ-2 Total Score (12-17 Years)- Positive if 3 or more points; Administer PHQ-A if positive 0 0       Abuse: Current or Past(Physical, Sexual or Emotional)- No  Do you feel safe in your environment? Yes        Social History     Tobacco Use     Smoking status: Former Smoker     Quit date: 1992     Years since quittin.4     Smokeless tobacco: Former User     Types: Chew   Substance Use Topics     Alcohol use: No     If you drink alcohol do you typically have >3 drinks per day or >7 drinks per week? No                      Last PSA:   Abbott PSA   Date Value Ref Range Status   2021 4.8 (H) < OR = 4.0 ng/mL Final     Comment:     The total PSA value from this assay system is   standardized against the WHO standard. The test   result will be approximately 20% lower when compared   to the equimolar-standardized total PSA (Fco   [place]). Comparison of serial PSA results should be   interpreted with this fact in mind.     This test was performed using the Siemens   chemiluminescent method. Values obtained from    different assay methods cannot be used  interchangeably. PSA levels, regardless of  value, should not be interpreted as absolute  evidence of the presence or absence of disease.         Reviewed orders with patient. Reviewed health maintenance and updated orders accordingly - Yes  BP Readings from Last 3 Encounters:   22 134/70   21 (!) 144/76   21 (!) 142/72    Wt Readings from Last 3 Encounters:   22 117.8 kg (259 lb 9.6 oz)   21 119.5 kg (263 lb 6.4 oz)   21 119.7 kg (264 lb)                  Patient Active Problem List   Diagnosis     Essential hypertension, benign     Vesicular palmoplantar eczema     Primary localized osteoarthrosis, other specified sites     ACP (advance care planning)     Health Care Home     Family history of prostate cancer     Dacrocystitis, left     Idiopathic chronic gout without tophus, unspecified site     Obesity (BMI 35.0-39.9) with comorbidity (H)     Prediabetes     Past Surgical History:   Procedure Laterality Date     HC PHAKIC IOL - IMPLANT FROM SURGEON  2013    right      HC PHAKIC IOL - IMPLANT FROM SURGEON  2013    left eye      ZZC ANESTH,HIP JOINT SURGERY      slipped epiphysis       Social History     Tobacco Use     Smoking status: Former Smoker     Quit date: 1992     Years since quittin.4     Smokeless tobacco: Former User     Types: Chew   Substance Use Topics     Alcohol use: No     Family History   Problem Relation Age of Onset     Heart Disease Mother          age 75     Heart Disease Father          mi age 65     Prostate Cancer Brother      Arthritis Brother         lupus         Current Outpatient Medications   Medication Sig Dispense Refill     desonide (DESOWEN) 0.05 % external ointment Apply topically 2 times daily 30 g 1     lisinopril (ZESTRIL) 40 MG tablet Take 1 tablet (40 mg) by mouth daily 90 tablet 1     probenecid (BENEMID) 500 MG tablet Take 1 tablet (500 mg) by mouth 2 times daily 180 tablet 1      traZODone (DESYREL) 50 MG tablet Take 1 tablet (50 mg) by mouth At Bedtime 30 tablet 1     Allergies   Allergen Reactions     Allopurinol      vasculitis, Dr Marin confirmed     Penicillins      Was a child-doesn't remember     Recent Labs   Lab Test 06/09/21  0000 06/08/20  0000 05/29/19  1541 05/29/19  1540 12/31/18  0810 07/16/18  1122 11/20/17  1132 06/19/17  1126   LDL 74 84  --  79  --  108*  --   --    HDL 45 43  --  36*  --  38*  --   --    TRIG 85 140  --  135  --  110  --   --    ALT  --   --   --   --   --  16 16 17   CR 1.08 0.99   < >  --  0.83 0.89 0.91 0.96   GFRESTIMATED  --   --   --   --  90 88 88 82   POTASSIUM 5.81* 4.44   < >  --  4.2 4.3 4.1 4.3    < > = values in this interval not displayed.        Reviewed and updated as needed this visit by clinical staff   Tobacco  Allergies  Meds                Reviewed and updated as needed this visit by Provider                   Past Medical History:   Diagnosis Date     Gout      Osteoarthritis       Past Surgical History:   Procedure Laterality Date     HC PHAKIC IOL - IMPLANT FROM SURGEON  2013    right      HC PHAKIC IOL - IMPLANT FROM SURGEON  2013    left eye      ZZC ANESTH,HIP JOINT SURGERY      slipped epiphysis       ROS:  CONSTITUTIONAL: NEGATIVE for fever, chills, change in weight  INTEGUMENTARY/SKIN: NEGATIVE for worrisome rashes, moles or lesions  EYES: NEGATIVE for vision changes or irritation  ENT: NEGATIVE for ear, mouth and throat problems  RESP: NEGATIVE for significant cough or SOB  CV: NEGATIVE for chest pain, palpitations or peripheral edema  GI: NEGATIVE for nausea, abdominal pain, heartburn, or change in bowel habits   male: negative for dysuria, hematuria, decreased urinary stream, erectile dysfunction, urethral discharge  MUSCULOSKELETAL: NEGATIVE for significant arthralgias or myalgia  NEURO: NEGATIVE for weakness, dizziness or paresthesias  ENDOCRINE: NEGATIVE for temperature intolerance, skin/hair  "changes  PSYCHIATRIC: NEGATIVE for changes in mood or affect    OBJECTIVE:   /70 (BP Location: Right arm, Patient Position: Chair, Cuff Size: Adult Large)   Pulse 72   Temp 97.2  F (36.2  C)   Resp 20   Ht 1.74 m (5' 8.5\")   Wt 117.8 kg (259 lb 9.6 oz)   BMI 38.90 kg/m    EXAM:  GENERAL: healthy, alert and no distress  EYES: Eyes grossly normal to inspection, PERRL and conjunctivae and sclerae normal  HENT: ear canals and TM's normal, nose and mouth without ulcers or lesions  NECK: no adenopathy, no asymmetry, masses, or scars and thyroid normal to palpation  RESP: lungs clear to auscultation - no rales, rhonchi or wheezes  CV: regular rate and rhythm, normal S1 S2, no S3 or S4, no murmur, click or rub, no peripheral edema and peripheral pulses strong  ABDOMEN: soft, nontender, no hepatosplenomegaly, no masses and bowel sounds normal   (male): normal male genitalia without lesions or urethral discharge, no hernia  MS: no gross musculoskeletal defects noted, no edema  SKIN: no suspicious lesions or rashes  NEURO: Normal strength and tone, mentation intact and speech normal  PSYCH: mentation appears normal, affect normal/bright     Diagnostic Test Results:  Labs reviewed in Epic    ASSESSMENT/PLAN:   (Z00.00) Routine general medical examination at a health care facility  (primary encounter diagnosis)  Comment: discussed preventitive healthcare   Plan: Lipid Panel ([facility]), Comprehensive Metobolic         Panel ([facility])        Continue to work on healthy diet and exercise, discussed healthy habits     (I10) Essential hypertension, benign  Comment: well controlled  Plan: Lipid Panel ([facility]), Comprehensive Metobolic         Panel ([facility]), lisinopril (ZESTRIL) 40 MG tablet,        VENOUS COLLECTION        continue current medications at current doses     (M1A.00X0) Idiopathic chronic gout without tophus, unspecified site  Comment: well controlled  Plan: probenecid (BENEMID) 500 MG tablet, VENOUS         COLLECTION, " "URIC ACID ([facility])        continue current medications at current doses     (R73.03) Prediabetes  Comment:   Plan: Lipid Panel ([facility]), Comprehensive Metobolic         Panel ([facility])        Continue to work on healthy diet and exercise, discussed healthy habits     (G47.00) Insomnia, unspecified type  Comment: melatonin not helping, can try trazodone  Plan: traZODone (DESYREL) 50 MG tablet        I reviewed the risks, benefits, and possible side effects of the medication.  The patient had an opportunity to ask any questions regarding the treatment plan. The patient was encouraged to call my office if any problems.     (Z80.42) FH: prostate cancer  Comment:   Plan: VENOUS COLLECTION, PSA Total ([facility])              Patient has been advised of split billing requirements and indicates understanding: Yes  COUNSELING:  Reviewed preventive health counseling, as reflected in patient instructions       Regular exercise       Healthy diet/nutrition       Vision screening       Colorectal cancer screening       Prostate cancer screening    Estimated body mass index is 38.9 kg/m  as calculated from the following:    Height as of this encounter: 1.74 m (5' 8.5\").    Weight as of this encounter: 117.8 kg (259 lb 9.6 oz).    Weight management plan: Discussed healthy diet and exercise guidelines    He reports that he quit smoking about 30 years ago. He has quit using smokeless tobacco.  His smokeless tobacco use included chew.      Counseling Resources:  ATP IV Guidelines  Pooled Cohorts Equation Calculator  FRAX Risk Assessment  ICSI Preventive Guidelines  Dietary Guidelines for Americans, 2010  USDA's MyPlate  ASA Prophylaxis  Lung CA Screening    Maco Galindo MD  [facility]  "

## 2022-06-16 NOTE — NURSING NOTE
Maco Montes De Oca is here for a CPX.    Pre-visit planning  Immunizations -up to date  Colonoscopy -is up to date  Mammogram -  Asthma test --  PHQ9 -  YELITZA 7 -      Questioned patient about current smoking habits.  Pt. quit smoking some time ago.  Body mass index is 38.9 kg/m .  PULSE regular  My Chart: active  CLASSIFICATION OF OVERWEIGHT AND OBESITY BY BMI                        Obesity Class           BMI(kg/m2)  Underweight                                    < 18.5  Normal                                         18.5-24.9  Overweight                                     25.0-29.9  OBESITY                     I                  30.0-34.9                             II                 35.0-39.9  EXTREME OBESITY             III                >40                            Patient's  BMI Body mass index is 38.9 kg/m .

## 2022-06-17 ENCOUNTER — MYC MEDICAL ADVICE (OUTPATIENT)
Dept: FAMILY MEDICINE | Facility: CLINIC | Age: 72
End: 2022-06-17

## 2022-06-17 DIAGNOSIS — R97.20 ELEVATED PROSTATE SPECIFIC ANTIGEN (PSA): Primary | ICD-10-CM

## 2022-06-17 LAB
ABBOTT PSA - QUEST: 7.2 NG/ML
URATE SERPL-MCNC: 8.4 MG/DL (ref 4–8)

## 2022-07-20 DIAGNOSIS — R97.20 ELEVATED PROSTATE SPECIFIC ANTIGEN (PSA): ICD-10-CM

## 2022-07-20 PROCEDURE — 36415 COLL VENOUS BLD VENIPUNCTURE: CPT | Performed by: FAMILY MEDICINE

## 2022-07-21 ENCOUNTER — TELEPHONE (OUTPATIENT)
Dept: FAMILY MEDICINE | Facility: CLINIC | Age: 72
End: 2022-07-21

## 2022-07-21 LAB — ABBOTT PSA - QUEST: 4.31 NG/ML

## 2022-07-21 NOTE — TELEPHONE ENCOUNTER
Alexandre called wanting to inform that he heard back from [place].   It is recommended that he gets a physical prostate exam within 6 months.    Routing to Dr. Galindo, thanks.

## 2022-10-09 ENCOUNTER — HEALTH MAINTENANCE LETTER (OUTPATIENT)
Age: 72
End: 2022-10-09

## 2022-10-24 ENCOUNTER — OFFICE VISIT (OUTPATIENT)
Dept: FAMILY MEDICINE | Facility: CLINIC | Age: 72
End: 2022-10-24

## 2022-10-24 VITALS
WEIGHT: 260.6 LBS | HEART RATE: 71 BPM | TEMPERATURE: 96.9 F | DIASTOLIC BLOOD PRESSURE: 80 MMHG | BODY MASS INDEX: 39.5 KG/M2 | HEIGHT: 68 IN | OXYGEN SATURATION: 97 % | SYSTOLIC BLOOD PRESSURE: 142 MMHG

## 2022-10-24 DIAGNOSIS — R97.20 ELEVATED PROSTATE SPECIFIC ANTIGEN (PSA): ICD-10-CM

## 2022-10-24 DIAGNOSIS — M1A.00X0 IDIOPATHIC CHRONIC GOUT WITHOUT TOPHUS, UNSPECIFIED SITE: ICD-10-CM

## 2022-10-24 DIAGNOSIS — R73.03 PREDIABETES: ICD-10-CM

## 2022-10-24 DIAGNOSIS — Z23 FLU VACCINE NEED: ICD-10-CM

## 2022-10-24 DIAGNOSIS — I10 ESSENTIAL HYPERTENSION, BENIGN: Primary | ICD-10-CM

## 2022-10-24 DIAGNOSIS — M54.50 MIDLINE LOW BACK PAIN WITHOUT SCIATICA, UNSPECIFIED CHRONICITY: ICD-10-CM

## 2022-10-24 PROCEDURE — 90662 IIV NO PRSV INCREASED AG IM: CPT | Performed by: FAMILY MEDICINE

## 2022-10-24 PROCEDURE — G0008 ADMIN INFLUENZA VIRUS VAC: HCPCS | Performed by: FAMILY MEDICINE

## 2022-10-24 PROCEDURE — 99214 OFFICE O/P EST MOD 30 MIN: CPT | Mod: 25 | Performed by: FAMILY MEDICINE

## 2022-10-24 RX ORDER — PROBENECID 500 MG/1
500 TABLET, FILM COATED ORAL 2 TIMES DAILY
Qty: 180 TABLET | Refills: 1 | Status: SHIPPED | OUTPATIENT
Start: 2022-10-24 | End: 2023-04-17

## 2022-10-24 RX ORDER — LISINOPRIL 40 MG/1
40 TABLET ORAL DAILY
Qty: 90 TABLET | Refills: 1 | Status: SHIPPED | OUTPATIENT
Start: 2022-10-24 | End: 2023-04-17

## 2022-10-24 NOTE — PROGRESS NOTES
Assessment & Plan     Essential hypertension, benign  Borderline today but good elsewhere, Check blood pressure readings outside of the clinic several times per week, write down values, and follow up if elevated within the next several weeks. Blood pressure can be checked at the firestation, drugstore,  or any valid site.   - lisinopril (ZESTRIL) 40 MG tablet  Dispense: 90 tablet; Refill: 1    Idiopathic chronic gout without tophus, unspecified site  Well controlled  - probenecid (BENEMID) 500 MG tablet  Dispense: 180 tablet; Refill: 1    Prediabetes  Continue to work on healthy diet and exercise, discussed healthy habits     Flu vaccine need      Elevated prostate specific antigen (PSA)  Last check decreased so can recheck in 6-12 months    Low back pain-recommend stretches, provided handout, f/u if not improving or worsening       Review of the result(s) of each unique test - labs  Ordering of each unique test  Prescription drug management         FUTURE APPOINTMENTS:       - Follow-up visit in 6 mo  Work on weight loss  Regular exercise    No follow-ups on file.    Maco Galindo MD  [facility]    Subjective   Alexandre is a 71 year old, presenting for the following health issues:  Recheck Medication (pt is here for a med check.)      HPI     Hypertension Follow-up      Do you check your blood pressure regularly outside of the clinic? Yes     Are you following a low salt diet? No    Are your blood pressures ever more than 140 on the top number (systolic) OR more   than 90 on the bottom number (diastolic), for example 140/90? No    Gout- no flares, using probencid daily- no med side effects     How many servings of fruits and vegetables do you eat daily?  2-3    On average, how many sweetened beverages do you drink each day (Examples: soda, juice, sweet tea, etc.  Do NOT count diet or artificially sweetened beverages)?   1    How many days per week do you exercise enough to make your heart  "beat faster? 7    How many minutes a day do you exercise enough to make your heart beat faster? 20 - 29    How many days per week do you miss taking your medication? 0    Pt notes low back issues on and off for 15 years- now bothering him a bit more, no radiation, No fecal incontinence, saddle numbness, fever, or weakness.  , pt not stretching    Review of Systems   Constitutional, HEENT, cardiovascular, pulmonary, gi and gu systems are negative, except as otherwise noted.      Objective    BP (!) 142/80 (BP Location: Right arm, Patient Position: Sitting, Cuff Size: Adult Large)   Pulse 71   Temp 96.9  F (36.1  C) (Temporal)   Ht 1.727 m (5' 8\")   Wt 118.2 kg (260 lb 9.6 oz)   SpO2 97%   BMI 39.62 kg/m    Body mass index is 39.62 kg/m .  Physical Exam   GENERAL: healthy, alert and no distress  NECK: no adenopathy, no asymmetry, masses, or scars and thyroid normal to palpation  RESP: lungs clear to auscultation - no rales, rhonchi or wheezes  CV: regular rate and rhythm, normal S1 S2, no S3 or S4, no murmur, click or rub, no peripheral edema and peripheral pulses strong  ABDOMEN: soft, nontender, no hepatosplenomegaly, no masses and bowel sounds normal  MS: no gross musculoskeletal defects noted, no edema  PSYCH: mentation appears normal, affect normal/bright    Orders Only on 07/20/2022   Component Date Value Ref Range Status     Abbott PSA 07/20/2022 4.31 (H)  < OR = 4.00 ng/mL Final    Comment: The total PSA value from this assay system is   standardized against the WHO standard. The test   result will be approximately 20% lower when compared   to the equimolar-standardized total PSA (Fco   [place]). Comparison of serial PSA results should be   interpreted with this fact in mind.     This test was performed using the Siemens   chemiluminescent method. Values obtained from   different assay methods cannot be used  interchangeably. PSA levels, regardless of  value, should not be interpreted as " absolute  evidence of the presence or absence of disease.

## 2022-10-24 NOTE — NURSING NOTE
Chief Complaint   Patient presents with     Recheck Medication     pt is here for a med check.     Pre-visit Screening:  Immunizations:  Flu done today  Colonoscopy:  is up to date  Mammogram: NA  Asthma Action Test/Plan:  AMANDA  PHQ9:  NA  GAD7:  NA  Questioned patient about current smoking habits Pt. has never smoked.  Ok to leave detailed message on voice mail for today's visit only yes, phone # [phone]

## 2023-04-17 ENCOUNTER — OFFICE VISIT (OUTPATIENT)
Dept: FAMILY MEDICINE | Facility: CLINIC | Age: 73
End: 2023-04-17

## 2023-04-17 VITALS
WEIGHT: 255 LBS | HEIGHT: 68 IN | SYSTOLIC BLOOD PRESSURE: 138 MMHG | RESPIRATION RATE: 20 BRPM | BODY MASS INDEX: 38.65 KG/M2 | DIASTOLIC BLOOD PRESSURE: 70 MMHG | TEMPERATURE: 98 F | HEART RATE: 116 BPM

## 2023-04-17 DIAGNOSIS — K64.5 THROMBOSED EXTERNAL HEMORRHOID: Primary | ICD-10-CM

## 2023-04-17 DIAGNOSIS — L30.9 DERMATITIS: ICD-10-CM

## 2023-04-17 DIAGNOSIS — I10 ESSENTIAL HYPERTENSION, BENIGN: ICD-10-CM

## 2023-04-17 DIAGNOSIS — M1A.00X0 IDIOPATHIC CHRONIC GOUT WITHOUT TOPHUS, UNSPECIFIED SITE: ICD-10-CM

## 2023-04-17 LAB
ALBUMIN SERPL-MCNC: 4.6 G/DL (ref 3.6–5.1)
ALBUMIN/GLOB SERPL: 1.4 {RATIO} (ref 1–2.5)
ALP SERPL-CCNC: 64 U/L (ref 33–130)
ALT 1742-6: 18 U/L (ref 0–32)
AST 1920-8: 18 U/L (ref 0–35)
BILIRUB SERPL-MCNC: 1.3 MG/DL (ref 0.2–1.2)
BUN SERPL-MCNC: 17 MG/DL (ref 7–25)
BUN/CREATININE RATIO: 17.3 (ref 6–22)
CALCIUM SERPL-MCNC: 10.1 MG/DL (ref 8.6–10.3)
CHLORIDE SERPLBLD-SCNC: 105.5 MMOL/L (ref 98–110)
CO2 SERPL-SCNC: 24 MMOL/L (ref 20–32)
CREAT SERPL-MCNC: 0.98 MG/DL (ref 0.6–1.3)
GLOBULIN, CALCULATED - QUEST: 3.4 (ref 1.9–3.7)
GLUCOSE SERPL-MCNC: 137 MG/DL (ref 60–99)
POTASSIUM SERPL-SCNC: 4.88 MMOL/L (ref 3.5–5.3)
PROT SERPL-MCNC: 8 G/DL (ref 6.1–8.1)
SODIUM SERPL-SCNC: 140.2 MMOL/L (ref 135–146)

## 2023-04-17 PROCEDURE — 80053 COMPREHEN METABOLIC PANEL: CPT | Performed by: FAMILY MEDICINE

## 2023-04-17 PROCEDURE — 99214 OFFICE O/P EST MOD 30 MIN: CPT | Performed by: FAMILY MEDICINE

## 2023-04-17 PROCEDURE — 36415 COLL VENOUS BLD VENIPUNCTURE: CPT | Performed by: FAMILY MEDICINE

## 2023-04-17 RX ORDER — DESONIDE 0.5 MG/G
OINTMENT TOPICAL 2 TIMES DAILY
Qty: 30 G | Refills: 1 | Status: SHIPPED | OUTPATIENT
Start: 2023-04-17

## 2023-04-17 RX ORDER — PROBENECID 500 MG/1
500 TABLET, FILM COATED ORAL 2 TIMES DAILY
Qty: 180 TABLET | Refills: 1 | Status: SHIPPED | OUTPATIENT
Start: 2023-04-17 | End: 2023-10-05

## 2023-04-17 RX ORDER — LISINOPRIL 40 MG/1
40 TABLET ORAL DAILY
Qty: 90 TABLET | Refills: 1 | Status: SHIPPED | OUTPATIENT
Start: 2023-04-17 | End: 2023-10-05

## 2023-04-17 NOTE — NURSING NOTE
Has a hemorrhoid that became sore, started about a weeks ago. States that it ruptured about 2 days ago. Has been bleeding since. Also needs a medication refill.    Questioned patient about current smoking habits.  Pt. quit smoking some time ago.  PULSE regular  My Chart: active  CLASSIFICATION OF OVERWEIGHT AND OBESITY BY BMI                        Obesity Class           BMI(kg/m2)  Underweight                                    < 18.5  Normal                                         18.5-24.9  Overweight                                     25.0-29.9  OBESITY                     I                  30.0-34.9                             II                 35.0-39.9  EXTREME OBESITY             III                >40                            Patient's  BMI Body mass index is 38.77 kg/m .  http://hin.nhlbi.nih.gov/menuplanner/menu.cgi  Pre-visit planning  Immunizations - up to date  Colonoscopy - is up to date  Mammogram -   Asthma -   PHQ9 -    YELITZA-7 -      The patient has verbalized that it is ok to leave a detailed voice message on the patient's cell phone with results/recommendations from this visit.

## 2023-04-17 NOTE — PROGRESS NOTES
"  Assessment & Plan     Thrombosed external hemorrhoid  Pt with large thrombosed hemmorrhoid- it has drained some already-recommend sitz baths, discussed lifestyle changes to prevent constipation including increased fluid intake, increased fiber intake, and increased exercise     If not improving readily refer to colrectal-he would go to [place] in that case    Dermatitis  Well controlled  - desonide (DESOWEN) 0.05 % external ointment  Dispense: 30 g; Refill: 1    Essential hypertension, benign  Well controlled, continue current medications at current doses   - lisinopril (ZESTRIL) 40 MG tablet  Dispense: 90 tablet; Refill: 1  - Comprehensive Metobolic Panel (B[facility])    Idiopathic chronic gout without tophus, unspecified site  Well controlled, continue current medications at current doses   - probenecid (BENEMID) 500 MG tablet  Dispense: 180 tablet; Refill: 1  - Comprehensive Metobolic Panel (BFP)      Review of the result(s) of each unique test - labs  Ordering of each unique test  Prescription drug management         BMI:   Estimated body mass index is 38.77 kg/m  as calculated from the following:    Height as of this encounter: 1.727 m (5' 8\").    Weight as of this encounter: 115.7 kg (255 lb).   Weight management plan: Discussed healthy diet and exercise guidelines    FUTURE APPOINTMENTS:       - Follow-up visit in 6 mo  Work on weight loss  Regular exercise    No follow-ups on file.    Maco Galindo MD  [facility]    Subjective   Alexandre is a 72 year old, presenting for the following health issues:  Hemorrhoids and Recheck Medication         View : No data to display.              HPI     Hypertension Follow-up      Do you check your blood pressure regularly outside of the clinic? Yes     Are you following a low salt diet? No    Are your blood pressures ever more than 140 on the top number (systolic) OR more   than 90 on the bottom number (diastolic), for example 140/90? No     Gout-no " "flares, using probencid    Pt has known external hemorrhoids- noted some pain and swelling last week, started bleeding 2 nights ago- pt saw clot the size of a quarter prior to this bleeding-states it burst-bleeding has persisted since    Pt has had 2 BM during that time-no blood in stools    Normal stool schedule formed BM daily-states he uses Miralax daily- was off for 4 days prior to this issue developing      How many servings of fruits and vegetables do you eat daily?  2-3    On average, how many sweetened beverages do you drink each day (Examples: soda, juice, sweet tea, etc.  Do NOT count diet or artificially sweetened beverages)?   1    How many days per week do you exercise enough to make your heart beat faster? 5    How many minutes a day do you exercise enough to make your heart beat faster? 30 - 60    How many days per week do you miss taking your medication? 0          Review of Systems   Constitutional, HEENT, cardiovascular, pulmonary, gi and gu systems are negative, except as otherwise noted.      Objective    /70 (BP Location: Left arm, Patient Position: Standing, Cuff Size: Adult Large)   Pulse 116   Temp 98  F (36.7  C) (Oral)   Resp 20   Ht 1.727 m (5' 8\")   Wt 115.7 kg (255 lb)   BMI 38.77 kg/m    Body mass index is 38.77 kg/m .  Physical Exam   GENERAL: healthy, alert and no distress  EYES: Eyes grossly normal to inspection, PERRL and conjunctivae and sclerae normal  HENT: ear canals and TM's normal, nose and mouth without ulcers or lesions  NECK: no adenopathy, no asymmetry, masses, or scars and thyroid normal to palpation  RESP: lungs clear to auscultation - no rales, rhonchi or wheezes  CV: regular rate and rhythm, normal S1 S2, no S3 or S4, no murmur, click or rub, no peripheral edema and peripheral pulses strong  ABDOMEN: soft, nontender, no hepatosplenomegaly, no masses and bowel sounds normal  RECTAL (male): large thrombosed external hemorrhoid at 10 o clock  MS: no gross " musculoskeletal defects noted, no edema  PSYCH: mentation appears normal, affect normal/bright    Orders Only on 07/20/2022   Component Date Value Ref Range Status     Abbott PSA 07/20/2022 4.31 (H)  < OR = 4.00 ng/mL Final    Comment: The total PSA value from this assay system is   standardized against the WHO standard. The test   result will be approximately 20% lower when compared   to the equimolar-standardized total PSA (Fco   [place]). Comparison of serial PSA results should be   interpreted with this fact in mind.     This test was performed using the Siemens   chemiluminescent method. Values obtained from   different assay methods cannot be used  interchangeably. PSA levels, regardless of  value, should not be interpreted as absolute  evidence of the presence or absence of disease.

## 2023-08-19 ENCOUNTER — HEALTH MAINTENANCE LETTER (OUTPATIENT)
Age: 73
End: 2023-08-19

## 2023-08-31 ENCOUNTER — TELEPHONE (OUTPATIENT)
Dept: FAMILY MEDICINE | Facility: CLINIC | Age: 73
End: 2023-08-31

## 2023-08-31 NOTE — TELEPHONE ENCOUNTER
Pt called asking for a referral to be sent to [facility] dermatology fax # [phone]    Please advise # [phone]    Thanks, [place]

## 2023-09-01 NOTE — TELEPHONE ENCOUNTER
I talked with patient regarding the Dermatology Referral to [facility]. Patient was advised that he will have to scheduled a consult with Dr. Galindo so he can issue a referral as needed.   Patient said that he will wait to see Dr Galindo in Oct to talk about the Dermatology Referral to [facility].     I went over with patient that he can see Dermatologist in the [facility] with out a providers referral. Patient said that he only wants to see [facility]

## 2023-10-05 ENCOUNTER — OFFICE VISIT (OUTPATIENT)
Dept: FAMILY MEDICINE | Facility: CLINIC | Age: 73
End: 2023-10-05

## 2023-10-05 VITALS
RESPIRATION RATE: 20 BRPM | HEIGHT: 68 IN | WEIGHT: 256.8 LBS | DIASTOLIC BLOOD PRESSURE: 74 MMHG | SYSTOLIC BLOOD PRESSURE: 136 MMHG | BODY MASS INDEX: 38.92 KG/M2 | TEMPERATURE: 97.2 F | HEART RATE: 72 BPM

## 2023-10-05 DIAGNOSIS — M72.0 DUPUYTREN'S DISEASE OF PALM OF LEFT HAND: ICD-10-CM

## 2023-10-05 DIAGNOSIS — I10 ESSENTIAL HYPERTENSION, BENIGN: ICD-10-CM

## 2023-10-05 DIAGNOSIS — Z00.00 ROUTINE GENERAL MEDICAL EXAMINATION AT A HEALTH CARE FACILITY: Primary | ICD-10-CM

## 2023-10-05 DIAGNOSIS — Z00.00 ENCOUNTER FOR MEDICARE ANNUAL WELLNESS EXAM: ICD-10-CM

## 2023-10-05 DIAGNOSIS — R97.20 ELEVATED PROSTATE SPECIFIC ANTIGEN (PSA): ICD-10-CM

## 2023-10-05 DIAGNOSIS — Z71.89 ACP (ADVANCE CARE PLANNING): ICD-10-CM

## 2023-10-05 DIAGNOSIS — M23.51 RECURRENT RIGHT KNEE INSTABILITY: ICD-10-CM

## 2023-10-05 DIAGNOSIS — L30.9 DERMATITIS: ICD-10-CM

## 2023-10-05 DIAGNOSIS — R73.03 PREDIABETES: ICD-10-CM

## 2023-10-05 DIAGNOSIS — M1A.00X0 IDIOPATHIC CHRONIC GOUT WITHOUT TOPHUS, UNSPECIFIED SITE: ICD-10-CM

## 2023-10-05 LAB
ALBUMIN SERPL-MCNC: 4.6 G/DL (ref 3.6–5.1)
ALBUMIN/GLOB SERPL: 1.4 {RATIO} (ref 1–2.5)
ALP SERPL-CCNC: 59 U/L (ref 33–130)
ALT 1742-6: 14 U/L (ref 0–32)
AST 1920-8: 13 U/L (ref 0–35)
BILIRUB SERPL-MCNC: 1.3 MG/DL (ref 0.2–1.2)
BUN SERPL-MCNC: 16 MG/DL (ref 7–25)
BUN/CREATININE RATIO: 15.7 (ref 6–32)
CALCIUM SERPL-MCNC: 9.5 MG/DL (ref 8.6–10.3)
CHLORIDE SERPLBLD-SCNC: 105.1 MMOL/L (ref 98–110)
CHOLEST SERPL-MCNC: 136 MG/DL (ref 0–199)
CHOLEST/HDLC SERPL: 3 {RATIO} (ref 0–5)
CO2 SERPL-SCNC: 23.8 MMOL/L (ref 20–32)
CREAT SERPL-MCNC: 1.02 MG/DL (ref 0.6–1.3)
GLOBULIN, CALCULATED - QUEST: 3.2 (ref 1.9–3.7)
GLUCOSE SERPL-MCNC: 120 MG/DL (ref 60–99)
HDLC SERPL-MCNC: 39 MG/DL (ref 40–150)
LDLC SERPL CALC-MCNC: 74 MG/DL (ref 0–130)
POTASSIUM SERPL-SCNC: 4.32 MMOL/L (ref 3.5–5.3)
PROT SERPL-MCNC: 7.8 G/DL (ref 6.1–8.1)
SODIUM SERPL-SCNC: 138.3 MMOL/L (ref 135–146)
TRIGL SERPL-MCNC: 116 MG/DL (ref 0–149)

## 2023-10-05 PROCEDURE — 90662 IIV NO PRSV INCREASED AG IM: CPT | Performed by: FAMILY MEDICINE

## 2023-10-05 PROCEDURE — G0008 ADMIN INFLUENZA VIRUS VAC: HCPCS | Performed by: FAMILY MEDICINE

## 2023-10-05 PROCEDURE — 99397 PER PM REEVAL EST PAT 65+ YR: CPT | Mod: 25 | Performed by: FAMILY MEDICINE

## 2023-10-05 PROCEDURE — 80053 COMPREHEN METABOLIC PANEL: CPT | Performed by: FAMILY MEDICINE

## 2023-10-05 PROCEDURE — 36415 COLL VENOUS BLD VENIPUNCTURE: CPT | Performed by: FAMILY MEDICINE

## 2023-10-05 PROCEDURE — 80061 LIPID PANEL: CPT | Performed by: FAMILY MEDICINE

## 2023-10-05 PROCEDURE — G0439 PPPS, SUBSEQ VISIT: HCPCS | Performed by: FAMILY MEDICINE

## 2023-10-05 RX ORDER — LISINOPRIL 40 MG/1
40 TABLET ORAL DAILY
Qty: 90 TABLET | Refills: 1 | Status: SHIPPED | OUTPATIENT
Start: 2023-10-05 | End: 2024-05-20

## 2023-10-05 RX ORDER — PROBENECID 500 MG/1
500 TABLET, FILM COATED ORAL 2 TIMES DAILY
Qty: 180 TABLET | Refills: 1 | Status: SHIPPED | OUTPATIENT
Start: 2023-10-05 | End: 2024-05-20

## 2023-10-05 NOTE — NURSING NOTE
Maco Montes De Oca is here for a CPX and Wellness    Pre-visit planning  Immunizations -up to date  Colonoscopy -is up to date  Mammogram -  Asthma test --  PHQ9 -  YLEITZA 7 -      Questioned patient about current smoking habits.  Pt. quit smoking some time ago.  Body mass index is 39.05 kg/m .  PULSE regular  My Chart: active  CLASSIFICATION OF OVERWEIGHT AND OBESITY BY BMI                        Obesity Class           BMI(kg/m2)  Underweight                                    < 18.5  Normal                                         18.5-24.9  Overweight                                     25.0-29.9  OBESITY                     I                  30.0-34.9                             II                 35.0-39.9  EXTREME OBESITY             III                >40                            Patient's  BMI Body mass index is 39.05 kg/m .      The patient has verbalized that it is ok to leave a detailed voice message on the patient's cell phone with results/recommendations from this visit.

## 2023-10-05 NOTE — PATIENT INSTRUCTIONS
Patient Education   Personalized Prevention Plan  You are due for the preventive services outlined below.  Your care team is available to assist you in scheduling these services.  If you have already completed any of these items, please share that information with your care team to update in your medical record.  Health Maintenance Due   Topic Date Due     LUNG CANCER SCREENING  Never done     AORTIC ANEURYSM SCREENING (SYSTEM ASSIGNED)  Never done     Annual Wellness Visit  06/16/2023     Flu Vaccine (1) 09/01/2023     COVID-19 Vaccine (5 - 2023-24 season) 09/01/2023

## 2023-10-05 NOTE — PROGRESS NOTES
Maco Montes De Oca is a 72 year old male who presents for Medicare Annual Wellness Visit.    Current providers caring for this patient include:  Patient Care Team:  Maco Galindo MD as PCP - General (Family Practice)  Maco Galindo MD as Assigned PCP    Complete Medical and Social history reviewed with patient, outlined below.    Patient Active Problem List   Diagnosis    Essential hypertension, benign    Vesicular palmoplantar eczema    Primary localized osteoarthrosis, other specified sites    ACP (advance care planning)    Health Care Home    Family history of prostate cancer    Dacrocystitis, left    Idiopathic chronic gout without tophus, unspecified site    Obesity (BMI 35.0-39.9) with comorbidity (H)    Prediabetes       Past Medical History:   Diagnosis Date    Gout     Osteoarthritis        Past Surgical History:   Procedure Laterality Date    HC PHAKIC IOL - IMPLANT FROM SURGEON  2013    right     HC PHAKIC IOL - IMPLANT FROM SURGEON  2013    left eye     ZZC ANESTH,HIP JOINT SURGERY      slipped epiphysis       Family History   Problem Relation Age of Onset    Heart Disease Mother          age 75    Heart Disease Father          mi age 65    Prostate Cancer Brother     Arthritis Brother         lupus       Social History     Tobacco Use    Smoking status: Former     Passive exposure: Past    Smokeless tobacco: Former     Types: Chew   Substance Use Topics    Alcohol use: No       Diet: regular, low salt/low fat  Physical Activity: active without specific exercise program  Depression Screen:    Over the past 2 weeks, patient has felt down, depressed, or hopeless:  No    Over the past 2 weeks, patient has felt little interest or pleasure in doing things: No    Functional ability/Safety screen:  Up and go test (able to get up and walk longer than 30 seconds): Passed  Patient needs assistance with: nothing  Patient's home has the following possible safety concerns: none  "identified  Patient has concerns about his hearing:  No  Cognitive Screen  Patient repeats three objects (ball, flag, tree)      Clock drawing test:   NORMAL  Recalls three objects after 3 minutes (ball,flag,tree):                                                                                               recalls 3 objects (3 points)    Physical Exam:  /74 (BP Location: Right arm, Patient Position: Chair, Cuff Size: Adult Large)   Pulse 72   Temp 97.2  F (36.2  C) (Temporal)   Resp 20   Ht 1.727 m (5' 8\")   Wt 116.5 kg (256 lb 12.8 oz)   BMI 39.05 kg/m     Body mass index is 39.05 kg/m .              End of Life Planning:   Patient currently has an advanced directive: Yes.  Practitioner is supportive of decision.    Education/Counseling:   Based on review of the above information, the following items were addressed:      Obesity - appropriate counseling on diet, exercise, etc.      Elevated blood pressure - follow-up plans made    Appropriate preventive services were discussed with this patient, including applicable screening as appropriate for cardiovascular disease, diabetes, osteopenia/osteoporosis, and glaucoma.  As appropriate for age/gender, discussed screening for colorectal cancer, prostate cancer, breast cancer, and cervical cancer.   Checklist reviewing preventive services available has been given to the patient.            3  SUBJECTIVE:   CC: Maco Montes De Oca is an 72 year old male who presents for preventive health visit.       Patient has been advised of split billing requirements and indicates understanding: Yes  Healthy Habits:  Do you get at least three servings of calcium containing foods daily (dairy, green leafy vegetables, etc.)? yes  Amount of exercise or daily activities, outside of work: most day(s) per week  Problems taking medications regularly No  Medication side effects: No  Have you had an eye exam in the past two years? yes  Do you see a dentist twice per year? yes  Do you " have sleep apnea, excessive snoring or daytime drowsiness?no      Pt wishes to go to [place] for skin check, also has had dermatitis    Pt also wishes to see [place] for known Dupuytrens ruel drsaad knee problems    Today's PHQ-2 Score:       10/5/2023     8:05 AM 4/17/2023     1:30 PM   PHQ-2 ( 1999 [employer])   Q1: Little interest or pleasure in doing things 0 0   Q2: Feeling down, depressed or hopeless 0 0   PHQ-2 Score 0 0       Abuse: Current or Past(Physical, Sexual or Emotional)- No  Do you feel safe in your environment? Yes        Social History     Tobacco Use    Smoking status: Former     Passive exposure: Past    Smokeless tobacco: Former     Types: Chew   Substance Use Topics    Alcohol use: No     If you drink alcohol do you typically have >3 drinks per day or >7 drinks per week? No                      Last PSA:   [facility] PSA   Date Value Ref Range Status   07/20/2022 4.31 (H) < OR = 4.00 ng/mL Final     Comment:     The total PSA value from this assay system is   standardized against the WHO standard. The test   result will be approximately 20% lower when compared   to the equimolar-standardized total PSA ([facility]   [place]). Comparison of serial PSA results should be   interpreted with this fact in mind.     This test was performed using the Siemens   chemiluminescent method. Values obtained from   different assay methods cannot be used  interchangeably. PSA levels, regardless of  value, should not be interpreted as absolute  evidence of the presence or absence of disease.         Reviewed orders with patient. Reviewed health maintenance and updated orders accordingly - Yes  BP Readings from Last 3 Encounters:   10/05/23 136/74   04/17/23 138/70   10/24/22 (!) 142/80    Wt Readings from Last 3 Encounters:   10/05/23 116.5 kg (256 lb 12.8 oz)   04/17/23 115.7 kg (255 lb)   10/24/22 118.2 kg (260 lb 9.6 oz)                  Patient Active Problem List   Diagnosis    Essential hypertension, benign    Vesicular  palmoplantar eczema    Primary localized osteoarthrosis, other specified sites    ACP (advance care planning)    Health Care Home    Family history of prostate cancer    Dacrocystitis, left    Idiopathic chronic gout without tophus, unspecified site    Obesity (BMI 35.0-39.9) with comorbidity (H)    Prediabetes     Past Surgical History:   Procedure Laterality Date    HC PHAKIC IOL - IMPLANT FROM SURGEON  2013    right     HC PHAKIC IOL - IMPLANT FROM SURGEON  2013    left eye     ZZC ANESTH,HIP JOINT SURGERY      slipped epiphysis       Social History     Tobacco Use    Smoking status: Former     Passive exposure: Past    Smokeless tobacco: Former     Types: Chew   Substance Use Topics    Alcohol use: No     Family History   Problem Relation Age of Onset    Heart Disease Mother          age 75    Heart Disease Father          mi age 65    Prostate Cancer Brother     Arthritis Brother         lupus    Pancreatic Cancer Brother          Current Outpatient Medications   Medication Sig Dispense Refill    desonide (DESOWEN) 0.05 % external ointment Apply topically 2 times daily 30 g 1    lisinopril (ZESTRIL) 40 MG tablet Take 1 tablet (40 mg) by mouth daily 90 tablet 1    probenecid (BENEMID) 500 MG tablet Take 1 tablet (500 mg) by mouth 2 times daily 180 tablet 1     Allergies   Allergen Reactions    Allopurinol      vasculitis, Dr Marin confirmed    Penicillins      Was a child-doesn't remember     Recent Labs   Lab Test 23  0000 22  0000 21  0000 20  0000 19  1540 18  0810 18  1122 17  1132 17  1126   LDL  --  82 74 84   < >  --  108*  --   --    HDL  --  36* 45 43   < >  --  38*  --   --    TRIG  --  126 85 140   < >  --  110  --   --    ALT  --   --   --   --   --   --  16 16 17   CR 0.98 0.94 1.08 0.99   < > 0.83 0.89 0.91 0.96   GFRESTIMATED  --   --   --   --   --  90 88 88 82   POTASSIUM 4.88 4.47 5.81* 4.44   < > 4.2 4.3 4.1 4.3    < > = values in  "this interval not displayed.        Reviewed and updated as needed this visit by clinical staff   Tobacco  Allergies  Meds  Problems  Med Hx  Surg Hx           Reviewed and updated as needed this visit by Provider                 Past Medical History:   Diagnosis Date    Gout     Osteoarthritis       Past Surgical History:   Procedure Laterality Date    HC PHAKIC IOL - IMPLANT FROM SURGEON  2013    right     HC PHAKIC IOL - IMPLANT FROM SURGEON  2013    left eye     ZZC ANESTH,HIP JOINT SURGERY      slipped epiphysis       ROS:  CONSTITUTIONAL: NEGATIVE for fever, chills, change in weight  INTEGUMENTARY/SKIN: NEGATIVE for worrisome rashes, moles or lesions  EYES: NEGATIVE for vision changes or irritation  ENT: NEGATIVE for ear, mouth and throat problems  RESP: NEGATIVE for significant cough or SOB  CV: NEGATIVE for chest pain, palpitations or peripheral edema  GI: NEGATIVE for nausea, abdominal pain, heartburn, or change in bowel habits   male: negative for dysuria, hematuria, decreased urinary stream, erectile dysfunction, urethral discharge  MUSCULOSKELETAL: NEGATIVE for significant arthralgias or myalgia  NEURO: NEGATIVE for weakness, dizziness or paresthesias  PSYCHIATRIC: NEGATIVE for changes in mood or affect    OBJECTIVE:   /74 (BP Location: Right arm, Patient Position: Chair, Cuff Size: Adult Large)   Pulse 72   Temp 97.2  F (36.2  C) (Temporal)   Resp 20   Ht 1.727 m (5' 8\")   Wt 116.5 kg (256 lb 12.8 oz)   BMI 39.05 kg/m    EXAM:  GENERAL: healthy, alert and no distress  EYES: Eyes grossly normal to inspection, PERRL and conjunctivae and sclerae normal  HENT: ear canals and TM's normal, nose and mouth without ulcers or lesions  NECK: no adenopathy, no asymmetry, masses, or scars and thyroid normal to palpation  RESP: lungs clear to auscultation - no rales, rhonchi or wheezes  CV: regular rate and rhythm, normal S1 S2, no S3 or S4, no murmur, click or rub, no peripheral edema and " peripheral pulses strong  ABDOMEN: soft, nontender, no hepatosplenomegaly, no masses and bowel sounds normal   (male): normal male genitalia without lesions or urethral discharge, no hernia  MS: no gross musculoskeletal defects noted, no edema  SKIN: no suspicious lesions or rashes  NEURO: Normal strength and tone, mentation intact and speech normal  PSYCH: mentation appears normal, affect normal/bright    Diagnostic Test Results:  Labs reviewed in Epic    ASSESSMENT/PLAN:   (Z00.00) Routine general medical examination at a health care facility  (primary encounter diagnosis)  Comment: discussed preventitive healthcare   Plan: Continue to work on healthy diet and exercise, discussed healthy habits     (I10) Essential hypertension, benign  Comment: well controlled  Plan: continue current medications at current doses     (R73.03) Prediabetes  Comment:   Plan: Continue to work on healthy diet and exercise, discussed healthy habits     (M1A.00X0) Idiopathic chronic gout without tophus, unspecified site  Comment: stable  Plan: continue current medications at current doses     (R97.20) Elevated prostate specific antigen (PSA)  Comment: recheck-has fluctuated in past, neg MRI  Plan:     (Z71.89) ACP (advance care planning)  Comment:   Plan:     (Z00.00) Encounter for Medicare annual wellness exam  Comment: discussed preventitive healthcare   Plan: Continue to work on healthy diet and exercise, discussed healthy habits       (M72.0) Dupuytren's disease of palm of left hand  Comment: pt desires [place] referral  Plan: Orthopedic  Referral            (M23.51) Recurrent right knee instability  Comment: pt desires [place] referral  Plan: Orthopedic  Referral            Dermatitis- pt desires [place] referral       Patient has been advised of split billing requirements and indicates understanding: Yes  COUNSELING:  Reviewed preventive health counseling, as reflected in patient instructions       Regular exercise        "Healthy diet/nutrition       Vision screening       Immunizations  Vaccinated for: Influenza           Colorectal cancer screening       Prostate cancer screening    Estimated body mass index is 39.05 kg/m  as calculated from the following:    Height as of this encounter: 1.727 m (5' 8\").    Weight as of this encounter: 116.5 kg (256 lb 12.8 oz).    Weight management plan: Discussed healthy diet and exercise guidelines    He reports that he has quit smoking. He has been exposed to tobacco smoke. He has quit using smokeless tobacco.  His smokeless tobacco use included chew.      Counseling Resources:  ATP IV Guidelines  Pooled Cohorts Equation Calculator  FRAX Risk Assessment  ICSI Preventive Guidelines  Dietary Guidelines for Americans, 2010  USDA's MyPlate  ASA Prophylaxis  Lung CA Screening    Maco Galindo MD  [facility]     "

## 2023-10-06 LAB — ABBOTT PSA - QUEST: 4.8 NG/ML

## 2023-12-26 ENCOUNTER — APPOINTMENT (RX ONLY)
Dept: URBAN - METROPOLITAN AREA CLINIC 58 | Facility: CLINIC | Age: 73
Setting detail: DERMATOLOGY
End: 2023-12-26

## 2023-12-26 DIAGNOSIS — B02.9 ZOSTER WITHOUT COMPLICATIONS: ICD-10-CM

## 2023-12-26 PROCEDURE — ? PRESCRIPTION

## 2023-12-26 PROCEDURE — ? COUNSELING

## 2023-12-26 PROCEDURE — 99203 OFFICE O/P NEW LOW 30 MIN: CPT

## 2023-12-26 RX ORDER — VALACYCLOVIR 1 G/1
TABLET, FILM COATED ORAL TID
Qty: 21 | Refills: 0 | Status: ERX | COMMUNITY
Start: 2023-12-26

## 2023-12-26 RX ORDER — BETAMETHASONE DIPROPIONATE 0.5 MG/G
CREAM TOPICAL
Qty: 45 | Refills: 0 | Status: ERX | COMMUNITY
Start: 2023-12-26

## 2023-12-26 RX ADMIN — VALACYCLOVIR: 1 TABLET, FILM COATED ORAL at 00:00

## 2023-12-26 RX ADMIN — BETAMETHASONE DIPROPIONATE: 0.5 CREAM TOPICAL at 00:00

## 2023-12-26 ASSESSMENT — LOCATION SIMPLE DESCRIPTION DERM
LOCATION SIMPLE: L4 RIGHT POSTERIOR DERMATOME
LOCATION SIMPLE: L2 RIGHT ANTERIOR DERMATOME

## 2023-12-26 ASSESSMENT — LOCATION DETAILED DESCRIPTION DERM
LOCATION DETAILED: L2 RIGHT ANTERIOR DERMATOME
LOCATION DETAILED: L4 RIGHT POSTERIOR DERMATOME

## 2023-12-26 ASSESSMENT — LOCATION ZONE DERM
LOCATION ZONE: TRUNK
LOCATION ZONE: LEG

## 2024-05-20 ENCOUNTER — OFFICE VISIT (OUTPATIENT)
Dept: FAMILY MEDICINE | Facility: CLINIC | Age: 74
End: 2024-05-20

## 2024-05-20 VITALS
DIASTOLIC BLOOD PRESSURE: 72 MMHG | TEMPERATURE: 97.5 F | HEART RATE: 72 BPM | WEIGHT: 261 LBS | SYSTOLIC BLOOD PRESSURE: 126 MMHG | RESPIRATION RATE: 20 BRPM | BODY MASS INDEX: 39.56 KG/M2 | HEIGHT: 68 IN

## 2024-05-20 DIAGNOSIS — R73.03 PREDIABETES: ICD-10-CM

## 2024-05-20 DIAGNOSIS — Z00.00 ROUTINE GENERAL MEDICAL EXAMINATION AT A HEALTH CARE FACILITY: Primary | ICD-10-CM

## 2024-05-20 DIAGNOSIS — L30.9 DERMATITIS: ICD-10-CM

## 2024-05-20 DIAGNOSIS — M1A.00X0 IDIOPATHIC CHRONIC GOUT WITHOUT TOPHUS, UNSPECIFIED SITE: ICD-10-CM

## 2024-05-20 DIAGNOSIS — I10 ESSENTIAL HYPERTENSION, BENIGN: ICD-10-CM

## 2024-05-20 DIAGNOSIS — Z12.11 SCREENING FOR COLON CANCER: ICD-10-CM

## 2024-05-20 DIAGNOSIS — R97.20 ELEVATED PROSTATE SPECIFIC ANTIGEN (PSA): ICD-10-CM

## 2024-05-20 LAB
ALBUMIN SERPL-MCNC: 4.3 G/DL (ref 3.6–5.1)
ALP SERPL-CCNC: 59 U/L (ref 33–130)
ALT 1742-6: 16 U/L (ref 0–32)
AST 1920-8: 21 U/L (ref 0–35)
BILIRUB SERPL-MCNC: 1.4 MG/DL (ref 0.2–1.2)
BUN SERPL-MCNC: 16 MG/DL (ref 7–25)
BUN/CREATININE RATIO: 16 (ref 6–32)
CALCIUM SERPL-MCNC: 10 MG/DL (ref 8.6–10.3)
CHLORIDE SERPLBLD-SCNC: 104.5 MMOL/L (ref 98–110)
CHOLEST SERPL-MCNC: 135 MG/DL (ref 0–199)
CHOLEST/HDLC SERPL: 3 {RATIO} (ref 0–5)
CO2 SERPL-SCNC: 25.7 MMOL/L (ref 20–32)
CREAT SERPL-MCNC: 1.02 MG/DL (ref 0.6–1.3)
GLUCOSE SERPL-MCNC: 109 MG/DL (ref 60–99)
HDLC SERPL-MCNC: 41 MG/DL (ref 40–150)
LDLC SERPL CALC-MCNC: 66 MG/DL (ref 0–130)
POTASSIUM SERPL-SCNC: 4.59 MMOL/L (ref 3.5–5.3)
PROT SERPL-MCNC: 7.7 G/DL (ref 6.1–8.1)
SODIUM SERPL-SCNC: 140 MMOL/L (ref 135–146)
TRIGL SERPL-MCNC: 142 MG/DL (ref 0–149)
URIC ACID (BFP): 5.8 (ref 2.5–7)

## 2024-05-20 PROCEDURE — 36415 COLL VENOUS BLD VENIPUNCTURE: CPT | Performed by: FAMILY MEDICINE

## 2024-05-20 PROCEDURE — 80061 LIPID PANEL: CPT | Performed by: FAMILY MEDICINE

## 2024-05-20 PROCEDURE — 99397 PER PM REEVAL EST PAT 65+ YR: CPT | Performed by: FAMILY MEDICINE

## 2024-05-20 PROCEDURE — 80053 COMPREHEN METABOLIC PANEL: CPT | Performed by: FAMILY MEDICINE

## 2024-05-20 PROCEDURE — 84550 ASSAY OF BLOOD/URIC ACID: CPT | Performed by: FAMILY MEDICINE

## 2024-05-20 RX ORDER — LISINOPRIL 40 MG/1
40 TABLET ORAL DAILY
Qty: 90 TABLET | Refills: 1 | Status: SHIPPED | OUTPATIENT
Start: 2024-05-20

## 2024-05-20 RX ORDER — KETOCONAZOLE 20 MG/ML
1 SHAMPOO TOPICAL
COMMUNITY
Start: 2023-10-23

## 2024-05-20 RX ORDER — CLINDAMYCIN PHOSPHATE 11.9 MG/ML
SOLUTION TOPICAL 2 TIMES DAILY
COMMUNITY
Start: 2024-05-04

## 2024-05-20 RX ORDER — PROBENECID 500 MG/1
500 TABLET, FILM COATED ORAL 2 TIMES DAILY
Qty: 180 TABLET | Refills: 1 | Status: SHIPPED | OUTPATIENT
Start: 2024-05-20

## 2024-05-20 NOTE — NURSING NOTE
Maco Montes De Oca is here for a CPX.    Pre-visit planning  Immunizations -up to date  Colonoscopy -is due and ordered today  Mammogram -  Asthma test --  PHQ9 -  YELITZA 7 -      Questioned patient about current smoking habits.  Pt. quit smoking some time ago.  Body mass index is 39.68 kg/m .  PULSE regular  My Chart: active  CLASSIFICATION OF OVERWEIGHT AND OBESITY BY BMI                        Obesity Class           BMI(kg/m2)  Underweight                                    < 18.5  Normal                                         18.5-24.9  Overweight                                     25.0-29.9  OBESITY                     I                  30.0-34.9                             II                 35.0-39.9  EXTREME OBESITY             III                >40                            Patient's  BMI Body mass index is 39.68 kg/m .      The patient has verbalized that it is ok to leave a detailed voice message on the patient's cell phone with results/recommendations from this visit.

## 2024-06-04 ENCOUNTER — TRANSFERRED RECORDS (OUTPATIENT)
Dept: FAMILY MEDICINE | Facility: CLINIC | Age: 74
End: 2024-06-04

## 2024-07-22 ENCOUNTER — TRANSFERRED RECORDS (OUTPATIENT)
Dept: FAMILY MEDICINE | Facility: CLINIC | Age: 74
End: 2024-07-22

## 2024-10-21 ENCOUNTER — OFFICE VISIT (OUTPATIENT)
Dept: FAMILY MEDICINE | Facility: CLINIC | Age: 74
End: 2024-10-21

## 2024-10-21 VITALS
WEIGHT: 253 LBS | HEART RATE: 68 BPM | HEIGHT: 68 IN | TEMPERATURE: 97.6 F | DIASTOLIC BLOOD PRESSURE: 64 MMHG | RESPIRATION RATE: 20 BRPM | BODY MASS INDEX: 38.34 KG/M2 | SYSTOLIC BLOOD PRESSURE: 130 MMHG

## 2024-10-21 DIAGNOSIS — M1A.00X0 IDIOPATHIC CHRONIC GOUT WITHOUT TOPHUS, UNSPECIFIED SITE: ICD-10-CM

## 2024-10-21 DIAGNOSIS — R97.20 ELEVATED PROSTATE SPECIFIC ANTIGEN (PSA): ICD-10-CM

## 2024-10-21 DIAGNOSIS — R73.03 PREDIABETES: ICD-10-CM

## 2024-10-21 DIAGNOSIS — I10 ESSENTIAL HYPERTENSION, BENIGN: Primary | ICD-10-CM

## 2024-10-21 DIAGNOSIS — Z23 NEED FOR VACCINATION: ICD-10-CM

## 2024-10-21 DIAGNOSIS — Z00.00 ENCOUNTER FOR ANNUAL WELLNESS EXAM IN MEDICARE PATIENT: ICD-10-CM

## 2024-10-21 PROCEDURE — G0008 ADMIN INFLUENZA VIRUS VAC: HCPCS | Performed by: FAMILY MEDICINE

## 2024-10-21 PROCEDURE — 99214 OFFICE O/P EST MOD 30 MIN: CPT | Mod: 25 | Performed by: FAMILY MEDICINE

## 2024-10-21 PROCEDURE — 90662 IIV NO PRSV INCREASED AG IM: CPT | Performed by: FAMILY MEDICINE

## 2024-10-21 PROCEDURE — G0439 PPPS, SUBSEQ VISIT: HCPCS | Performed by: FAMILY MEDICINE

## 2024-10-21 PROCEDURE — 36415 COLL VENOUS BLD VENIPUNCTURE: CPT | Performed by: FAMILY MEDICINE

## 2024-10-21 RX ORDER — PROBENECID 500 MG/1
500 TABLET, FILM COATED ORAL 2 TIMES DAILY
Qty: 180 TABLET | Refills: 1 | Status: SHIPPED | OUTPATIENT
Start: 2024-10-21

## 2024-10-21 RX ORDER — LISINOPRIL 40 MG/1
40 TABLET ORAL DAILY
Qty: 90 TABLET | Refills: 1 | Status: SHIPPED | OUTPATIENT
Start: 2024-10-21

## 2024-10-21 NOTE — PROGRESS NOTES
Maco Montes De Oca is a 73 year old male who presents for Medicare Annual Wellness Visit.    Current providers caring for this patient include:  Patient Care Team:  Maco Galindo MD as PCP - General (Family Practice)  Maco Galindo MD as Assigned PCP    Complete Medical and Social history reviewed with patient, outlined below.    Patient Active Problem List   Diagnosis    Essential hypertension, benign    Vesicular palmoplantar eczema    Primary localized osteoarthrosis, other specified sites    ACP (advance care planning)    Family history of prostate cancer    Dacrocystitis, left    Idiopathic chronic gout without tophus, unspecified site    Obesity (BMI 35.0-39.9) with comorbidity (H)    Prediabetes       Past Medical History:   Diagnosis Date    Gout     Osteoarthritis        Past Surgical History:   Procedure Laterality Date    HC PHAKIC IOL - IMPLANT FROM SURGEON  2013    right     HC PHAKIC IOL - IMPLANT FROM SURGEON  2013    left eye     ZZC ANESTH,HIP JOINT SURGERY      slipped epiphysis       Family History   Problem Relation Age of Onset    Heart Disease Mother          age 75    Heart Disease Father          mi age 65    Prostate Cancer Brother     Arthritis Brother         lupus    Pancreatic Cancer Brother        Social History     Tobacco Use    Smoking status: Former     Passive exposure: Past    Smokeless tobacco: Former     Types: Chew   Substance Use Topics    Alcohol use: No       Diet: regular, low salt/low fat  Physical Activity: active without specific exercise program  Depression Screen:    Over the past 2 weeks, patient has felt down, depressed, or hopeless:  No    Over the past 2 weeks, patient has felt little interest or pleasure in doing things: No    Functional ability/Safety screen:  Up and go test (able to get up and walk longer than 30 seconds): Passed  Patient needs assistance with: nothing  Patient's home has the following possible safety concerns:  "none identified  Patient has concerns about his hearing:  No  Cognitive Screen  Patient repeats three objects (ball, flag, tree)      Clock drawing test:   NORMAL  Recalls three objects after 3 minutes (ball,flag,tree):                                                                                               recalls 3 objects (3 points)    Physical Exam:  /64 (BP Location: Left arm, Patient Position: Chair, Cuff Size: Adult Regular)   Pulse 68   Temp 97.6  F (36.4  C) (Temporal)   Resp 20   Ht 1.727 m (5' 8\")   Wt 114.8 kg (253 lb)   BMI 38.47 kg/m     Body mass index is 38.47 kg/m .              End of Life Planning:   Patient currently has an advanced directive: Yes.  Practitioner is supportive of decision.    Education/Counseling:   Based on review of the above information, the following items were addressed:      Elevated blood pressure - follow-up plans made    Appropriate preventive services were discussed with this patient, including applicable screening as appropriate for cardiovascular disease, diabetes, osteopenia/osteoporosis, and glaucoma.  As appropriate for age/gender, discussed screening for colorectal cancer, prostate cancer, breast cancer, and cervical cancer.   Checklist reviewing preventive services available has been given to the patient.              Assessment & Plan     Essential hypertension, benign  Well controlled, continue current medications at current doses   - lisinopril (ZESTRIL) 40 MG tablet  Dispense: 90 tablet; Refill: 1  - Comprehensive Metobolic Panel (BFP)  - VENOUS COLLECTION    Idiopathic chronic gout without tophus, unspecified site  Well controlled, continue current medications at current doses   - probenecid (BENEMID) 500 MG tablet  Dispense: 180 tablet; Refill: 1    Encounter for annual wellness exam in Medicare patient  discussed preventitive healthcare Continue to work on healthy diet and exercise, discussed healthy habits   Personalized Prevention " Plan  You are due for the preventive services outlined below.  Your care team is available to assist you in scheduling these services.  If you have already completed any of these items, please share that information with your care team to update in your medical record.  Health Maintenance   Topic Date Due    LUNG CANCER SCREENING  Never done    AORTIC ANEURYSM SCREENING (SYSTEM ASSIGNED)  Never done    INFLUENZA VACCINE (1) 09/01/2024    COVID-19 Vaccine (5 - 2024-25 season) 09/01/2024    MEDICARE ANNUAL WELLNESS VISIT  05/20/2025    BMP  05/20/2025    FALL RISK ASSESSMENT  10/21/2025    RSV VACCINE (1 - 1-dose 75+ series) 12/19/2025    DTAP/TDAP/TD IMMUNIZATION (3 - Td or Tdap) 04/03/2027    GLUCOSE  05/20/2027    ADVANCE CARE PLANNING  10/05/2028    LIPID  05/20/2029    COLORECTAL CANCER SCREENING  07/17/2029    HEPATITIS C SCREENING  Completed    PHQ-2 (once per calendar year)  Completed    Pneumococcal Vaccine: 65+ Years  Completed    ZOSTER IMMUNIZATION  Completed    HPV IMMUNIZATION  Aged Out    MENINGITIS IMMUNIZATION  Aged Out    RSV MONOCLONAL ANTIBODY  Aged Out        Prediabetes  Continue to work on healthy diet and exercise, discussed healthy habits     Elevated prostate specific antigen (PSA)  Pt prefer to recheck in 6 mo as he has been biking a lot of late and this has elevated his reading in past- discussed his mild persistent elevations, shared decision making  - VENOUS COLLECTION    Need for vaccination  flu              FUTURE APPOINTMENTS:       - Follow-up visit in 6 mo  Work on weight loss  Regular exercise    No follow-ups on file.    Subjective   Alexandre is a 73 year old, presenting for the following health issues:  No chief complaint on file.    HPI       Hypertension Follow-up    Do you check your blood pressure regularly outside of the clinic? Yes   Are you following a low salt diet? No  Are your blood pressures ever more than 140 on the top number (systolic) OR more   than 90 on the bottom  "number (diastolic), for example 140/90? No    Gout- no flares, probenecid daily  How many servings of fruits and vegetables do you eat daily?  2-3  On average, how many sweetened beverages do you drink each day (Examples: soda, juice, sweet tea, etc.  Do NOT count diet or artificially sweetened beverages)?   1  How many days per week do you exercise enough to make your heart beat faster? 6  How many minutes a day do you exercise enough to make your heart beat faster? 30 - 60  How many days per week do you miss taking your medication? 0        Review of Systems  Constitutional, HEENT, cardiovascular, pulmonary, gi and gu systems are negative, except as otherwise noted.      Objective    /64 (BP Location: Left arm, Patient Position: Chair, Cuff Size: Adult Regular)   Pulse 68   Temp 97.6  F (36.4  C) (Temporal)   Resp 20   Ht 1.727 m (5' 8\")   Wt 114.8 kg (253 lb)   BMI 38.47 kg/m    Body mass index is 38.47 kg/m .  Physical Exam   GENERAL: alert and no distress  EYES: Eyes grossly normal to inspection, PERRL and conjunctivae and sclerae normal  HENT: ear canals and TM's normal, nose and mouth without ulcers or lesions  NECK: no adenopathy, no asymmetry, masses, or scars  RESP: lungs clear to auscultation - no rales, rhonchi or wheezes  CV: regular rate and rhythm, normal S1 S2, no S3 or S4, no murmur, click or rub, no peripheral edema  ABDOMEN: soft, nontender, no hepatosplenomegaly, no masses and bowel sounds normal  MS: no gross musculoskeletal defects noted, no edema  PSYCH: mentation appears normal, affect normal/bright    Office Visit on 05/20/2024   Component Date Value Ref Range Status    Carbon Dioxide 05/20/2024 25.7  20 - 32 mmol/L Final    Creatinine 05/20/2024 1.02  0.60 - 1.30 mg/dL Final    Glucose 05/20/2024 109 (A)  60 - 99 mg/dL Final    Sodium 05/20/2024 140.0  135 - 146 mmol/L Final    Potassium 05/20/2024 4.59  3.5 - 5.3 mmol/L Final    Chloride 05/20/2024 104.5  98 - 110 mmol/L Final "    Protein Total 05/20/2024 7.7  6.1 - 8.1 g/dL Final    Albumin 05/20/2024 4.3  3.6 - 5.1 g/dL Final    Alkaline Phosphatase 05/20/2024 59  33 - 130 U/L Final    ALT 05/20/2024 16  0 - 32 U/L Final    AST 05/20/2024 21  0 - 35 U/L Final    Bilirubin Total 05/20/2024 1.4 (A)  0.2 - 1.2 mg/dL Final    Urea Nitrogen 05/20/2024 16  7 - 25 mg/dL Final    Calcium 05/20/2024 10.0  8.6 - 10.3 mg/dL Final    BUN/Creatinine Ratio 05/20/2024 16  6 - 32 Final    Cholesterol 05/20/2024 135  0 - 199 mg/dL Final    Triglycerides 05/20/2024 142  0 - 149 mg/dL Final    HDL Cholesterol 05/20/2024 41  40 - 150 mg/dL Final    LDL Cholesterol Direct 05/20/2024 66  0 - 130 mg/dL Final    Cholesterol/HDL Ratio 05/20/2024 3  0 - 5 Final    URIC ACID (BFP) 05/20/2024 5.8  2.5 - 7 Final           Signed Electronically by: Maco Galindo MD

## 2024-10-22 LAB
ALBUMIN SERPL-MCNC: 4.7 G/DL (ref 3.6–5.1)
ALBUMIN/GLOB SERPL: 1.6 (CALC) (ref 1–2.5)
ALP SERPL-CCNC: 64 U/L (ref 35–144)
ALT SERPL-CCNC: 15 U/L (ref 9–46)
AST SERPL-CCNC: 22 U/L (ref 10–35)
BILIRUB SERPL-MCNC: 1.2 MG/DL (ref 0.2–1.2)
BUN SERPL-MCNC: 28 MG/DL (ref 7–25)
BUN/CREATININE RATIO: 28 (CALC) (ref 6–22)
CALCIUM SERPL-MCNC: 10.1 MG/DL (ref 8.6–10.3)
CHLORIDE SERPLBLD-SCNC: 104 MMOL/L (ref 98–110)
CO2 SERPL-SCNC: 24 MMOL/L (ref 20–32)
CREAT SERPL-MCNC: 1 MG/DL (ref 0.7–1.28)
EGFR (QUEST): 79 ML/MIN/1.73M2
GLOBULIN, CALCULATED - QUEST: 2.9 G/DL (CALC) (ref 1.9–3.7)
GLUCOSE - QUEST: 101 MG/DL (ref 65–99)
POTASSIUM SERPL-SCNC: 4.6 MMOL/L (ref 3.5–5.3)
PROT SERPL-MCNC: 7.6 G/DL (ref 6.1–8.1)
SODIUM SERPL-SCNC: 139 MMOL/L (ref 135–146)

## 2025-04-30 ENCOUNTER — TRANSFERRED RECORDS (OUTPATIENT)
Dept: FAMILY MEDICINE | Facility: CLINIC | Age: 75
End: 2025-04-30

## 2025-05-01 ENCOUNTER — TRANSFERRED RECORDS (OUTPATIENT)
Dept: FAMILY MEDICINE | Facility: CLINIC | Age: 75
End: 2025-05-01

## 2025-05-12 ENCOUNTER — OFFICE VISIT (OUTPATIENT)
Dept: FAMILY MEDICINE | Facility: CLINIC | Age: 75
End: 2025-05-12

## 2025-05-12 VITALS
BODY MASS INDEX: 37.44 KG/M2 | HEART RATE: 87 BPM | WEIGHT: 247 LBS | HEIGHT: 68 IN | DIASTOLIC BLOOD PRESSURE: 72 MMHG | OXYGEN SATURATION: 99 % | SYSTOLIC BLOOD PRESSURE: 132 MMHG

## 2025-05-12 DIAGNOSIS — M1A.00X0 IDIOPATHIC CHRONIC GOUT WITHOUT TOPHUS, UNSPECIFIED SITE: ICD-10-CM

## 2025-05-12 DIAGNOSIS — R73.03 PREDIABETES: ICD-10-CM

## 2025-05-12 DIAGNOSIS — Z00.00 ROUTINE GENERAL MEDICAL EXAMINATION AT A HEALTH CARE FACILITY: Primary | ICD-10-CM

## 2025-05-12 DIAGNOSIS — I10 ESSENTIAL HYPERTENSION, BENIGN: ICD-10-CM

## 2025-05-12 DIAGNOSIS — E66.01 CLASS 2 SEVERE OBESITY DUE TO EXCESS CALORIES WITH SERIOUS COMORBIDITY AND BODY MASS INDEX (BMI) OF 37.0 TO 37.9 IN ADULT (H): ICD-10-CM

## 2025-05-12 DIAGNOSIS — R97.20 ELEVATED PROSTATE SPECIFIC ANTIGEN (PSA): ICD-10-CM

## 2025-05-12 DIAGNOSIS — E66.812 CLASS 2 SEVERE OBESITY DUE TO EXCESS CALORIES WITH SERIOUS COMORBIDITY AND BODY MASS INDEX (BMI) OF 37.0 TO 37.9 IN ADULT (H): ICD-10-CM

## 2025-05-12 LAB
ALBUMIN SERPL-MCNC: 4.7 G/DL (ref 3.6–5.1)
ALP SERPL-CCNC: 55 U/L (ref 33–130)
ALT 1742-6: 11 U/L (ref 0–32)
AST 1920-8: 16 U/L (ref 0–35)
BILIRUB SERPL-MCNC: 1.4 MG/DL (ref 0.2–1.2)
BUN SERPL-MCNC: 20 MG/DL (ref 7–25)
BUN/CREATININE RATIO: 19 (ref 6–32)
CALCIUM SERPL-MCNC: 10.3 MG/DL (ref 8.6–10.3)
CHLORIDE SERPLBLD-SCNC: 107.7 MMOL/L (ref 98–110)
CO2 SERPL-SCNC: 25.4 MMOL/L (ref 20–32)
CREAT SERPL-MCNC: 1.06 MG/DL (ref 0.6–1.3)
GLUCOSE SERPL-MCNC: 123 MG/DL (ref 60–99)
POTASSIUM SERPL-SCNC: 4.58 MMOL/L (ref 3.5–5.3)
PROT SERPL-MCNC: 8.3 G/DL (ref 6.1–8.1)
SODIUM SERPL-SCNC: 135.5 MMOL/L (ref 135–146)
URIC ACID (BFP): 5.7 (ref 2.5–7)

## 2025-05-12 PROCEDURE — 84550 ASSAY OF BLOOD/URIC ACID: CPT | Performed by: FAMILY MEDICINE

## 2025-05-12 PROCEDURE — 36415 COLL VENOUS BLD VENIPUNCTURE: CPT | Performed by: FAMILY MEDICINE

## 2025-05-12 PROCEDURE — 99397 PER PM REEVAL EST PAT 65+ YR: CPT | Performed by: FAMILY MEDICINE

## 2025-05-12 PROCEDURE — 80053 COMPREHEN METABOLIC PANEL: CPT | Performed by: FAMILY MEDICINE

## 2025-05-12 RX ORDER — LISINOPRIL 40 MG/1
40 TABLET ORAL DAILY
Qty: 90 TABLET | Refills: 1 | Status: SHIPPED | OUTPATIENT
Start: 2025-05-12

## 2025-05-12 RX ORDER — PROBENECID 500 MG/1
500 TABLET, FILM COATED ORAL 2 TIMES DAILY
Qty: 180 TABLET | Refills: 1 | Status: SHIPPED | OUTPATIENT
Start: 2025-05-12

## 2025-05-12 NOTE — PROGRESS NOTES
3  SUBJECTIVE:   CC: Maco Montes De Oca is an 74 year old male who presents for preventive health visit.       Patient has been advised of split billing requirements and indicates understanding: Yes  Healthy Habits:  Do you get at least three servings of calcium containing foods daily (dairy, green leafy vegetables, etc.)? yes  Amount of exercise or daily activities, outside of work: 6 day(s) per week  Problems taking medications regularly No  Medication side effects: No  Have you had an eye exam in the past two years? yes  Do you see a dentist twice per year? yes  Do you have sleep apnea, excessive snoring or daytime drowsiness?allie drowsiness      No gout flares in years    Today's PHQ-2 Score:       5/12/2025     7:57 AM 10/21/2024     7:34 AM   PHQ-2 ( 1999 [employer])   Q1: Little interest or pleasure in doing things 0 0   Q2: Feeling down, depressed or hopeless 0 0   PHQ-2 Score 0 0       Abuse: Current or Past(Physical, Sexual or Emotional)- No  Do you feel safe in your environment? Yes    Have you ever done Advance Care Planning? (For example, a Health Directive, POLST, or a discussion with a medical provider or your loved ones about your wishes): Yes, advance care planning is on file.    Social History     Tobacco Use    Smoking status: Former     Passive exposure: Past    Smokeless tobacco: Former     Types: Chew   Substance Use Topics    Alcohol use: No     If you drink alcohol do you typically have >3 drinks per day or >7 drinks per week? No                      Last PSA:   [facility] PSA   Date Value Ref Range Status   10/05/2023 4.80 (H) < OR = 4.00 ng/mL Final     Comment:     The total PSA value from this assay system is   standardized against the WHO standard. The test   result will be approximately 20% lower when compared   to the equimolar-standardized total PSA (Fco   Vidya). Comparison of serial PSA results should be   interpreted with this fact in mind.     This test was performed using the Siemens    chemiluminescent method. Values obtained from   different assay methods cannot be used  interchangeably. PSA levels, regardless of  value, should not be interpreted as absolute  evidence of the presence or absence of disease.         Reviewed orders with patient. Reviewed health maintenance and updated orders accordingly - Yes  BP Readings from Last 3 Encounters:   25 132/72   10/21/24 130/64   24 126/72    Wt Readings from Last 3 Encounters:   25 112 kg (247 lb)   10/21/24 114.8 kg (253 lb)   24 118.4 kg (261 lb)                  Patient Active Problem List   Diagnosis    Essential hypertension, benign    Vesicular palmoplantar eczema    Primary localized osteoarthrosis, other specified sites    ACP (advance care planning)    Family history of prostate cancer    Dacrocystitis, left    Idiopathic chronic gout without tophus, unspecified site    Obesity (BMI 35.0-39.9) with comorbidity (H)    Prediabetes     Past Surgical History:   Procedure Laterality Date    HC PHAKIC IOL - IMPLANT FROM SURGEON  2013    right     HC PHAKIC IOL - IMPLANT FROM SURGEON  2013    left eye     ZZC ANESTH,HIP JOINT SURGERY      slipped epiphysis       Social History     Tobacco Use    Smoking status: Former     Passive exposure: Past    Smokeless tobacco: Former     Types: Chew   Substance Use Topics    Alcohol use: No     Family History   Problem Relation Age of Onset    Heart Disease Mother          age 75    Heart Disease Father          mi age 65    Prostate Cancer Brother     Arthritis Brother         lupus    Pancreatic Cancer Brother          Current Outpatient Medications   Medication Sig Dispense Refill    clindamycin (CLEOCIN T) 1 % external solution Apply topically 2 times daily Joshua Gorman      desonide (DESOWEN) 0.05 % external ointment Apply topically 2 times daily 30 g 1    ketoconazole (NIZORAL) 2 % external shampoo Apply 1 Application topically Joshua Gorman       lisinopril (ZESTRIL) 40 MG tablet Take 1 tablet (40 mg) by mouth daily. 90 tablet 1    probenecid (BENEMID) 500 MG tablet Take 1 tablet (500 mg) by mouth 2 times daily. 180 tablet 1     Allergies   Allergen Reactions    Allopurinol      vasculitis, Dr Marin confirmed     Recent Labs   Lab Test 10/21/24  1535 05/20/24  0000 10/05/23  0000 04/17/23  0000 06/16/22  0000 05/29/19  1540 12/31/18  0810 07/16/18  1122 11/20/17  1132   LDL  --  66 74  --  82   < >  --  108*  --    HDL  --  41 39*  --  36*   < >  --  38*  --    TRIG  --  142 116  --  126   < >  --  110  --    ALT 15  --   --   --   --   --   --  16 16   CR 1.00 1.02 1.02   < > 0.94   < > 0.83 0.89 0.91   GFRESTIMATED  --   --   --   --   --   --  90 88 88   POTASSIUM 4.6 4.59 4.32   < > 4.47   < > 4.2 4.3 4.1    < > = values in this interval not displayed.        Reviewed and updated as needed this visit by clinical staff   Tobacco  Allergies  Meds              Reviewed and updated as needed this visit by Provider                  Past Medical History:   Diagnosis Date    Gout     Osteoarthritis       Past Surgical History:   Procedure Laterality Date    HC PHAKIC IOL - IMPLANT FROM SURGEON  2013    right     HC PHAKIC IOL - IMPLANT FROM SURGEON  2013    left eye     ZZC ANESTH,HIP JOINT SURGERY      slipped epiphysis       ROS:  CONSTITUTIONAL: NEGATIVE for fever, chills, change in weight  INTEGUMENTARY/SKIN: NEGATIVE for worrisome rashes, moles or lesions  EYES: NEGATIVE for vision changes or irritation  ENT: NEGATIVE for ear, mouth and throat problems  RESP: NEGATIVE for significant cough or SOB  CV: NEGATIVE for chest pain, palpitations or peripheral edema  GI: NEGATIVE for nausea, abdominal pain, heartburn, or change in bowel habits   male: negative for dysuria, hematuria, decreased urinary stream, erectile dysfunction, urethral discharge  MUSCULOSKELETAL: NEGATIVE for significant arthralgias or myalgia  NEURO: NEGATIVE for weakness, dizziness or  "paresthesias  PSYCHIATRIC: NEGATIVE for changes in mood or affect    OBJECTIVE:   /72 (BP Location: Left arm, Patient Position: Sitting, Cuff Size: Adult Large)   Pulse 87   Ht 1.727 m (5' 8\")   Wt 112 kg (247 lb)   SpO2 99%   BMI 37.56 kg/m    EXAM:  GENERAL: alert and no distress  EYES: Eyes grossly normal to inspection, PERRL and conjunctivae and sclerae normal  HENT: ear canals and TM's normal, nose and mouth without ulcers or lesions  NECK: no adenopathy, no asymmetry, masses, or scars  RESP: lungs clear to auscultation - no rales, rhonchi or wheezes  CV: regular rate and rhythm, normal S1 S2, no S3 or S4, no murmur, click or rub, no peripheral edema  ABDOMEN: soft, nontender, no hepatosplenomegaly, no masses and bowel sounds normal   (male): normal male genitalia without lesions or urethral discharge, no hernia  MS: no gross musculoskeletal defects noted, no edema  SKIN: no suspicious lesions or rashes  NEURO: Normal strength and tone, mentation intact and speech normal  PSYCH: mentation appears normal, affect normal/bright    Diagnostic Test Results:  Labs reviewed in Epic    ASSESSMENT/PLAN:   (Z00.00) Routine general medical examination at a health care facility  (primary encounter diagnosis)  Comment: discussed preventitive healthcare   Plan: Continue to work on healthy diet and exercise, discussed healthy habits     (I10) Essential hypertension, benign  Comment: well controlled  Plan: lisinopril (ZESTRIL) 40 MG tablet,         Comprehensive Metobolic Panel ([facility])        continue current medications at current doses     (R73.03) Prediabetes  Comment:   Plan: Comprehensive Metobolic Panel ([facility])        Continue to work on healthy diet and exercise, discussed healthy habits     (R97.20) Elevated prostate specific antigen (PSA)  Comment:   Plan: PSA Total ([facility])            (M1A.00X0) Idiopathic chronic gout without tophus, unspecified site  Comment: well controlled, does not tolerate allopurinol- " "will be on this medication long term  Plan: probenecid (BENEMID) 500 MG tablet, Uric Acid         ([facility])            Patient has been advised of split billing requirements and indicates understanding: Yes  COUNSELING:  Reviewed preventive health counseling, as reflected in patient instructions       Regular exercise       Healthy diet/nutrition       Vision screening       Hearing screening       Immunizations  Recommend covid           Colorectal cancer screening       Prostate cancer screening    Estimated body mass index is 37.56 kg/m  as calculated from the following:    Height as of this encounter: 1.727 m (5' 8\").    Weight as of this encounter: 112 kg (247 lb).    Weight management plan: Discussed healthy diet and exercise guidelines    He reports that he has quit smoking. He has been exposed to tobacco smoke. He has quit using smokeless tobacco.  His smokeless tobacco use included chew.      Counseling Resources:  ATP IV Guidelines  Pooled Cohorts Equation Calculator  FRAX Risk Assessment  ICSI Preventive Guidelines  Dietary Guidelines for Americans, 2010  USDA's MyPlate  ASA Prophylaxis  Lung CA Screening    Maco Galindo MD  [facility]  "

## 2025-05-12 NOTE — NURSING NOTE
Chief Complaint   Patient presents with    Physical     Annual, fasting     Recheck Medication     Needs refills of two medications today     Pre-visit Screening:  Immunizations:  up to date  Colonoscopy:  is up to date  Mammogram: ruel  Asthma Action Test/Plan:  ruel  PHQ9:  phq2 done today   GAD7:  na  Questioned patient about current smoking habits Pt. quit smoking some time ago.  Ok to leave detailed message on voice mail for today's visit only yes, phone # [phone] (home)

## 2025-05-13 ENCOUNTER — RESULTS FOLLOW-UP (OUTPATIENT)
Dept: FAMILY MEDICINE | Facility: CLINIC | Age: 75
End: 2025-05-13

## 2025-05-13 LAB — ABBOTT PSA - QUEST: 5.01 NG/ML

## 2025-05-19 ENCOUNTER — TRANSFERRED RECORDS (OUTPATIENT)
Dept: FAMILY MEDICINE | Facility: CLINIC | Age: 75
End: 2025-05-19

## 2025-06-03 ENCOUNTER — TRANSFERRED RECORDS (OUTPATIENT)
Dept: FAMILY MEDICINE | Facility: CLINIC | Age: 75
End: 2025-06-03